# Patient Record
Sex: FEMALE | Race: WHITE | NOT HISPANIC OR LATINO | Employment: OTHER | URBAN - METROPOLITAN AREA
[De-identification: names, ages, dates, MRNs, and addresses within clinical notes are randomized per-mention and may not be internally consistent; named-entity substitution may affect disease eponyms.]

---

## 2019-04-18 ENCOUNTER — TELEPHONE (OUTPATIENT)
Dept: HEMATOLOGY/ONCOLOGY | Facility: CLINIC | Age: 84
End: 2019-04-18

## 2019-04-18 NOTE — TELEPHONE ENCOUNTER
I spoke with Navigator Jill and patient (separately) yesterday and explained the process of Onco consult - chemo orders- insurance pre authorization - schedule chemo.  These cannot be done same day.    Pt scheduled for C2 d8 on May 1 and d15 on May 8.  She is then off for 2 weeks -- she will be restaged at that time.  They are seeking co-management of care where pt can have treatment [place] and periodic check at [place].  Patient today states she has not made a decision yet and will contact me if she thinks she still wants to come here.     Notified [place] Navigator of patient's update. She said that  pt was pushing for chemo in [place] this week because of Jazz fest here.  She is checking with other facilities.   Navigator had advised pt that since she will be spending time here in future, it will be prudent for her to have a local oncologist.    =====================================  ----- Message -----  From: Sarah Wong RN  Sent: 4/18/2019   2:46 PM    Called Nurse Jill Portillo. Recording mentioned she is off Thurs.  Left mss to callback to discuss.  If requesting switch chemo here 5/1, may be too tight as need consult with a breast onc and need more time for pre-auth.    ======================================  ----- Message from Dottie Ramos sent at 4/18/2019 11:42 AM CDT -----  Contact: Breast cancer and infusion treatment   Hi Cancer navigators,     Dr Celeste Trejo is referring this pt to [facility].     Pt is relocating to live with her daughter in [facility].     Pt will need to est care AND continue her infusion treatments 1st week in may.  The pt will have an infusion treatment 4[place]19 [place]    The refrl, records, order are in media mgr.     Can you plz contact the pt to schedule.    Thank you,  Dottie Ramos  [id number]

## 2019-05-09 ENCOUNTER — TELEPHONE (OUTPATIENT)
Dept: SURGERY | Facility: CLINIC | Age: 84
End: 2019-05-09

## 2019-05-10 ENCOUNTER — TELEPHONE (OUTPATIENT)
Dept: SURGERY | Facility: CLINIC | Age: 84
End: 2019-05-10

## 2019-05-14 ENCOUNTER — TELEPHONE (OUTPATIENT)
Dept: SURGERY | Facility: CLINIC | Age: 84
End: 2019-05-14

## 2019-05-14 NOTE — TELEPHONE ENCOUNTER
Spoke with pt, scheduled appt with Dr. Cerna on 5/28. Pt given address and location. Pt verbalized understanding.    ----- Message from Sarah Wong RN sent at 5/9/2019  8:23 AM CDT -----  Regarding: Breast Ca splitting care  [place],    Pt getting chemo  in [place].  Previously wanted to get chemo here while visiting daughter. Because of short notice, couldn't consider request (wanted consult & chemo same day).    Pt is calling now because she wants to split time between dtr in [place] and other dtr here in near future  She said her [place] onco & ins. co. ok with it.      Can you check on this request and let pt know?     Thanks,  Sarah

## 2019-05-28 ENCOUNTER — OFFICE VISIT (OUTPATIENT)
Dept: HEMATOLOGY/ONCOLOGY | Facility: CLINIC | Age: 84
End: 2019-05-28
Payer: MEDICARE

## 2019-05-28 VITALS
SYSTOLIC BLOOD PRESSURE: 132 MMHG | RESPIRATION RATE: 20 BRPM | HEART RATE: 84 BPM | TEMPERATURE: 98 F | HEIGHT: 68 IN | DIASTOLIC BLOOD PRESSURE: 68 MMHG | BODY MASS INDEX: 23.08 KG/M2 | WEIGHT: 152.31 LBS

## 2019-05-28 DIAGNOSIS — Z17.0 MALIGNANT NEOPLASM OF CENTRAL PORTION OF RIGHT BREAST IN FEMALE, ESTROGEN RECEPTOR POSITIVE: Primary | ICD-10-CM

## 2019-05-28 DIAGNOSIS — C79.51 BONE METASTASES: ICD-10-CM

## 2019-05-28 DIAGNOSIS — T45.1X5A CHEMOTHERAPY INDUCED NEUTROPENIA: ICD-10-CM

## 2019-05-28 DIAGNOSIS — C50.111 MALIGNANT NEOPLASM OF CENTRAL PORTION OF RIGHT BREAST IN FEMALE, ESTROGEN RECEPTOR POSITIVE: Primary | ICD-10-CM

## 2019-05-28 DIAGNOSIS — D70.1 CHEMOTHERAPY INDUCED NEUTROPENIA: ICD-10-CM

## 2019-05-28 DIAGNOSIS — I10 BENIGN ESSENTIAL HTN: ICD-10-CM

## 2019-05-28 PROCEDURE — 3078F DIAST BP <80 MM HG: CPT | Mod: CPTII,S$GLB,, | Performed by: INTERNAL MEDICINE

## 2019-05-28 PROCEDURE — 99205 OFFICE O/P NEW HI 60 MIN: CPT | Mod: S$GLB,,, | Performed by: INTERNAL MEDICINE

## 2019-05-28 PROCEDURE — 99205 PR OFFICE/OUTPT VISIT, NEW, LEVL V, 60-74 MIN: ICD-10-PCS | Mod: S$GLB,,, | Performed by: INTERNAL MEDICINE

## 2019-05-28 PROCEDURE — 99999 PR PBB SHADOW E&M-EST. PATIENT-LVL III: CPT | Mod: PBBFAC,,, | Performed by: INTERNAL MEDICINE

## 2019-05-28 PROCEDURE — 3078F PR MOST RECENT DIASTOLIC BLOOD PRESSURE < 80 MM HG: ICD-10-PCS | Mod: CPTII,S$GLB,, | Performed by: INTERNAL MEDICINE

## 2019-05-28 PROCEDURE — 3075F SYST BP GE 130 - 139MM HG: CPT | Mod: CPTII,S$GLB,, | Performed by: INTERNAL MEDICINE

## 2019-05-28 PROCEDURE — 3075F PR MOST RECENT SYSTOLIC BLOOD PRESS GE 130-139MM HG: ICD-10-PCS | Mod: CPTII,S$GLB,, | Performed by: INTERNAL MEDICINE

## 2019-05-28 PROCEDURE — 99999 PR PBB SHADOW E&M-EST. PATIENT-LVL III: ICD-10-PCS | Mod: PBBFAC,,, | Performed by: INTERNAL MEDICINE

## 2019-05-28 PROCEDURE — 1101F PT FALLS ASSESS-DOCD LE1/YR: CPT | Mod: CPTII,S$GLB,, | Performed by: INTERNAL MEDICINE

## 2019-05-28 PROCEDURE — 1101F PR PT FALLS ASSESS DOC 0-1 FALLS W/OUT INJ PAST YR: ICD-10-PCS | Mod: CPTII,S$GLB,, | Performed by: INTERNAL MEDICINE

## 2019-05-28 RX ORDER — LATANOPROST 50 UG/ML
1 SOLUTION/ DROPS OPHTHALMIC
COMMUNITY
Start: 2016-03-12

## 2019-05-28 RX ORDER — DESONIDE 0.5 MG/G
CREAM TOPICAL
Refills: 0 | COMMUNITY
Start: 2019-05-09

## 2019-05-28 RX ORDER — CETIRIZINE HYDROCHLORIDE 5 MG/1
5 TABLET ORAL
COMMUNITY

## 2019-05-28 RX ORDER — CLOBETASOL PROPIONATE 0.5 MG/G
1 OINTMENT TOPICAL
COMMUNITY
Start: 2018-11-06

## 2019-05-28 RX ORDER — TIMOLOL MALEATE 5 MG/ML
1 SOLUTION/ DROPS OPHTHALMIC
COMMUNITY
Start: 2017-12-07

## 2019-05-28 RX ORDER — OMEPRAZOLE 20 MG/1
20 CAPSULE, DELAYED RELEASE ORAL DAILY
Refills: 0 | COMMUNITY
Start: 2019-04-25

## 2019-05-28 RX ORDER — ERYTHROMYCIN 5 MG/G
OINTMENT OPHTHALMIC
Refills: 1 | COMMUNITY
Start: 2019-05-22

## 2019-05-28 RX ORDER — MONTELUKAST SODIUM 10 MG/1
10 TABLET ORAL NIGHTLY
Refills: 1 | COMMUNITY
Start: 2019-04-04

## 2019-05-28 RX ORDER — FLUTICASONE PROPIONATE 50 MCG
2 SPRAY, SUSPENSION (ML) NASAL
COMMUNITY
Start: 2019-05-09

## 2019-05-28 RX ORDER — ONDANSETRON 4 MG/1
4 TABLET, FILM COATED ORAL EVERY 6 HOURS PRN
COMMUNITY
Start: 2019-03-29

## 2019-05-28 RX ORDER — ACETAMINOPHEN 500 MG
500 TABLET ORAL
COMMUNITY

## 2019-05-28 RX ORDER — LIDOCAINE AND PRILOCAINE 25; 25 MG/G; MG/G
CREAM TOPICAL DAILY PRN
Refills: 2 | COMMUNITY
Start: 2019-04-05

## 2019-05-28 NOTE — PLAN OF CARE
START OFF PATHWAY REGIMEN - Breast            Atezolizumab (Tecentriq(R))       Nab-paclitaxel (protein bound) (Abraxane(R))     **Always confirm dose/schedule in your pharmacy ordering system**        Patient Characteristics:  Distant Metastases or Locoregional Recurrent Disease - Unresected, HER2   Negative/Unknown/Equivocal, ER Negative/Unknown, Chemotherapy, First Line  Therapeutic Status: Distant Metastases  BRCA Mutation Status: Absent  ER Status: Negative (-)  HER2 Status: Negative (-)  DC Status: Negative (-)  Line of therapy: First Line  Intent of Therapy:  Non-Curative / Palliative Intent, Discussed with Patient

## 2019-05-28 NOTE — ASSESSMENT & PLAN NOTE
- Cycle 3 day 15 due June 5th with 20% dose reduction of Abraxane (already reduced in our plan entered here). She will receive in [place]  - Rad Onc and NSGY eval ongoing for possibel C2 progression  - Patient will notify us of her schedule and when she would like to receive treatment here. She knows that the more advanced notice we have, the better.   - Plan for repeat imaging studies with Dr Brewer after completion of 3rd cycle.   - Will need to compare doses with CareEverywhere doses before each dose

## 2019-05-28 NOTE — PROGRESS NOTES
History:     Reason For Consultation:   1. 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 Follows with [facility].     Referring Provider:   Aaareferral Self  No address on file    Records Obtained: Records of the patients history including those obtained from the referring provider were reviewed and summarized in detail.    HPI:   Soni Schwartz presents for consultation breast cancer. She is postmenopausal. She presented for neck pain in January 2019 at outside hospital. MRI of neck showed concerning C2 abnormality worrisome for malignancy. Breast exam revealed a right breast mass which was confirmed on mammogram and US. Biopsy of the right breast mass showed grade 3 invasive ductal carcinoma, weakly ER positive (5%), [place] neg, Her2 negative, Ki67 50%. PET showed a 2.3 cm right breast mass and a C2 lytic lesion. She received EBRT to C2 and started Abraxane/Tecentriq. She is hoping to receive some of her care in [place] since she has one daughter that lives here. She's tolerating chemotherapy quite well with side effects of fatigue and neutropenia requiring a 20% dose reduction in Abraxane. Recently, here neck pain returned (had initially improved with RT) and imaging is concerning for possible progression and/or fracture. She has seen NSGY and Rad Onc and may have further surgery/RT. She's due for cycle 3 day 15 on June 5th - has delayed day 15 dose for a wedding.       Past Medical   Past Medical History:   Diagnosis Date    Allergy     Breast cancer     GERD (gastroesophageal reflux disease)     Glaucoma     Venous stasis      Patient Active Problem List   Diagnosis    Malignant neoplasm of central portion of right breast in female, estrogen receptor positive    Chemotherapy induced neutropenia    Benign essential HTN    Bone metastases     Social History   Social History     Socioeconomic History    Marital status:       Spouse name: Not on file    Number of children: Not on file    Years of education: Not on file    Highest education level: Not on file   Occupational History    Not on file   Social Needs    Financial resource strain: Not on file    Food insecurity:     Worry: Not on file     Inability: Not on file    Transportation needs:     Medical: Not on file     Non-medical: Not on file   Tobacco Use    Smoking status: Former Smoker     Packs/day: 2.00     Years: 30.00     Pack years: 60.00     Types: Cigarettes     Last attempt to quit:      Years since quittin.4    Smokeless tobacco: Never Used   Substance and Sexual Activity    Alcohol use: Yes     Comment: Occasionally     Drug use: Not Currently    Sexual activity: Not Currently   Lifestyle    Physical activity:     Days per week: Not on file     Minutes per session: Not on file    Stress: Not on file   Relationships    Social connections:     Talks on phone: Not on file     Gets together: Not on file     Attends Cheondoism service: Not on file     Active member of club or organization: Not on file     Attends meetings of clubs or organizations: Not on file     Relationship status: Not on file   Other Topics Concern    Not on file   Social History Narrative    Not on file     Family History  Family History   Problem Relation Age of Onset    Breast cancer Sister     Cancer Brother         Brain and Kidney     Parkinsonism Mother     Diabetes Father     No Known Problems Maternal Aunt     Leukemia Maternal Uncle     No Known Problems Paternal Aunt     No Known Problems Paternal Uncle     No Known Problems Maternal Grandmother     No Known Problems Maternal Grandfather     No Known Problems Paternal Grandmother     No Known Problems Paternal Grandfather     Cancer Brother     No Known Problems Brother     No Known Problems Brother     Thyroid disease Sister     No Known Problems Sister     No Known Problems Daughter      No Known Problems Daughter     No Known Problems Son      Medications    Current Outpatient Medications:     acetaminophen (TYLENOL) 500 MG tablet, Take 500 mg by mouth., Disp: , Rfl:     cetirizine (ZYRTEC) 5 MG tablet, Take 5 mg by mouth., Disp: , Rfl:     clobetasol 0.05% (TEMOVATE) 0.05 % Oint, Apply 1 application topically., Disp: , Rfl:     desonide (DESOWEN) 0.05 % cream, APPLY TOPICALLY 2 (TWO) TIMES DAILY AS NEEDED FOR ITCHING., Disp: , Rfl: 0    erythromycin (ROMYCIN) ophthalmic ointment, APPLY A SMALL AMOUNT ON EYELID TWICE A DAY USE ON EYELIDS BILATERAL AFTER COMPRESS AND LID SCRUB, Disp: , Rfl: 1    fluticasone propionate (FLONASE) 50 mcg/actuation nasal spray, 2 sprays by Nasal route., Disp: , Rfl:     latanoprost 0.005 % ophthalmic solution, 1 drop., Disp: , Rfl:     lidocaine-prilocaine (EMLA) cream, daily as needed. Apply to affected area, Disp: , Rfl: 2    montelukast (SINGULAIR) 10 mg tablet, Take 10 mg by mouth nightly., Disp: , Rfl: 1    omeprazole (PRILOSEC) 20 MG capsule, Take 20 mg by mouth once daily., Disp: , Rfl: 0    ondansetron (ZOFRAN) 4 MG tablet, Take 4 mg by mouth every 6 (six) hours as needed., Disp: , Rfl:     timolol maleate 0.5% (TIMOPTIC) 0.5 % Drop, 1 drop., Disp: , Rfl:   Allergies  Review of patient's allergies indicates:   Allergen Reactions    Indomethacin submicronized Hallucinations    Naproxen Other (See Comments)     GI Upset     Sulfa (sulfonamide antibiotics) Rash     Review of Systems  Review of Systems   Constitutional: Positive for fatigue. Negative for activity change, appetite change, chills, fever and unexpected weight change.   HENT: Negative for congestion, hearing loss, nosebleeds, sinus pressure and trouble swallowing.    Eyes: Negative for pain, discharge and itching.   Respiratory: Negative for cough, chest tightness and shortness of breath.    Cardiovascular: Negative for chest pain, palpitations and leg swelling.   Gastrointestinal:  "Negative for abdominal distention, abdominal pain, blood in stool, diarrhea, nausea, rectal pain and vomiting.   Endocrine: Negative for heat intolerance and polydipsia.   Genitourinary: Negative for difficulty urinating, dysuria, flank pain, frequency, hematuria and urgency.   Musculoskeletal: Positive for back pain and neck pain. Negative for arthralgias.   Skin: Negative for color change, pallor and rash.   Neurological: Negative for dizziness, numbness and headaches.   Hematological: Negative for adenopathy. Does not bruise/bleed easily.   Psychiatric/Behavioral: Negative for confusion and decreased concentration. The patient is not nervous/anxious.         Objective     Objective:     Vitals:    05/28/19 1033   BP: 132/68   BP Location: Left arm   Patient Position: Sitting   Pulse: 84   Resp: 20   Temp: 98.2 °F (36.8 °C)   TempSrc: Oral   Weight: 69.1 kg (152 lb 5.4 oz)   Height: 5' 8" (1.727 m)     Body surface area is 1.82 meters squared.  GENERAL:  Well-developed, well-nourished female in no acute distress. Vital signs reviewed. Accompanied by daughter.  SKIN:  Warm, dry without rashes, purpura or petechiae.  EYES:  Pupils equal, round and reactive to light.  EOMs intact.  Conjunctivae normal.  HEAD:  Normocephalic.  EARS/NOSE/MOUTH/THROAT:  Hearing intact. Septum midline.  No excoriations or nasal discharge. No stomatitis or ulcers.  Lips normal. Oropharynx without lesions or exudates.  NECK:  Supple with good range of motion; no thyromegaly or masses  LYMPHATICS:  No cervical, supraclavicular, axillary adenopathy.  RESP:  Lungs clear to percussion and auscultation. Good airflow. Normal respiratory effort.   CARDIAC:  Regular rate and rhythm without murmurs, rubs or gallops. Normal S1,S2. No edema  GI:  Soft, nontender, no hepatosplenomegaly, normal bowel sounds  MSK:  No clubbing or cyanosis, No joint swelling noted in hands  NEUROLOGICAL:  Cranial Nerves II-XII grossly intact.  No focal neurological " deficits.  PSYCHIATRIC:  Normal affect and mood.  Alert and Oriented x 3.     Labs and Imaging  Outside labs and imaging reviewed.   Breast pathology and imaging as above.     Assessment     Assessment:     Problem List Items Addressed This Visit        Cardiac/Vascular    Benign essential HTN       Oncology    Malignant neoplasm of central portion of right breast in female, estrogen receptor positive - Primary    Overview     1. 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 Follows with [facility].    * Presented with oligometastatic disease to C2 and 2.3 cm right breast mass   * status post EBRT to C2, completed 3/22/19   * Currently on weekly Abraxane days 1,8,15 every 28 days with Tecentric days 1 and 15 every 28 days. 20% dose reduction in Abraxane.   * Follows with Dr Celeste Brewer in [place] but is spending some time with her daughter in [place] thus desires to receive some treatments in [place], thus we will be working in conjunction with Dr Brewer. Scans and major treatment decisions will be done in [place]. Dr Brewer's plan is to do at least 4-6 cycles of chemo/immunotherapy and if no progression, then consider stopping Abraxane and continue with immunotherapy.    * After 2.5 cycles, improvement in breast mass but possible progression of C2. Plan repeat imaging after completed 3 cycles.          Current Assessment & Plan     - Cycle 3 day 15 due June 5th with 20% dose reduction of Abraxane (already reduced in our plan entered here). She will receive in [place]  - Rad Onc and NSGY eval ongoing for possibel C2 progression  - Patient will notify us of her schedule and when she would like to receive treatment here. She knows that the more advanced notice we have, the better.   - Plan for repeat imaging studies with Dr Brewer after completion of 3rd cycle.   - Will need to compare doses with CareEverywhere doses before each  dose         Chemotherapy induced neutropenia    Overview     Improved with dose reduction         Bone metastases    Overview     Xgeva with Dr Brewer in [place]               Plan:     1. Will go on and get prior auth for treatment - she will be receiving some doses here in [place] and some in [place]. She will notify us when she wants treatment here. Will needs labs - cbc, cmp and possible MD visit before treatment.     Follow-up when treatment needed in town. I asked the patient to call for any questions, concerns, or new symptoms.    Distress Screening Results: Psychosocial Distress screening score of Distress Score: 0 noted and reviewed. No intervention indicated.     I spent 60 minutes with patient and family with 55 spent face to face counseling on diagnosis, goals of therapy, side effects.

## 2019-05-28 NOTE — Clinical Note
Prior auth for entered plan, but patient will be receiving treatments both in [place] and [place]. Will call when she's ready for treatment here.

## 2019-06-11 ENCOUNTER — PATIENT MESSAGE (OUTPATIENT)
Dept: HEMATOLOGY/ONCOLOGY | Facility: CLINIC | Age: 84
End: 2019-06-11

## 2019-06-19 ENCOUNTER — PATIENT MESSAGE (OUTPATIENT)
Dept: HEMATOLOGY/ONCOLOGY | Facility: CLINIC | Age: 84
End: 2019-06-19

## 2019-06-19 ENCOUNTER — TELEPHONE (OUTPATIENT)
Dept: HEMATOLOGY/ONCOLOGY | Facility: CLINIC | Age: 84
End: 2019-06-19

## 2019-06-19 DIAGNOSIS — Z51.11 ENCOUNTER FOR ANTINEOPLASTIC CHEMOTHERAPY: Primary | ICD-10-CM

## 2019-06-19 NOTE — TELEPHONE ENCOUNTER
Contacted patient to discuss her concerns. Patient due for C4D1 Tecentriq/taxol chemotherapy per Antwan Lutz Notes. Per patient she is coming in to town tomorrow but her cycle is not due until 7/3. Explained to patient she will need labs, and provider visit beforehand.   Patient requesting to have labs the day before at an [facility] location close to [facility]. Will arrange.  Patient also requesting to see specifically Dr. Cerna if at all possible, given this is her first chemo infusion here at [facility] and she is already apprehensive. Per our records, patient will need to be consented.   Explained to patient Dr. Cerna schedule full the day she is due to come in. Will discuss with Dr. Cerna upon her return to clinic to see if she would be willing to make an exception to see the patient. She expressed gratitude.   Explained to patient would follow up with her on Monday to confirm and discuss all appointments.   She expressed gratitude.

## 2019-06-19 NOTE — TELEPHONE ENCOUNTER
----- Message from Octavio Hunt sent at 6/19/2019  2:14 PM CDT -----  Contact: Dr Brewer ([place] [facility])  Would like a call to coordinate pt's infusion schedule while the pt is in town. Please contact to assist.      Contact:: [phone]

## 2019-06-20 NOTE — TELEPHONE ENCOUNTER
Followed up with patient this morning to verify and confirm all appointments were arranged.   Explained to patient that Dr. Cerna responded and is able to see patient the afternoon of 7/3 prior to her chemo. Patient very appreciative. Confirmed all future dates, times, locations of upcoming appointments. All additional questions/concerns answered. Patient expressed gratitude.   Patient advised to contact clinic for any other concerns.

## 2019-06-24 ENCOUNTER — DOCUMENTATION ONLY (OUTPATIENT)
Dept: HEMATOLOGY/ONCOLOGY | Facility: CLINIC | Age: 84
End: 2019-06-24

## 2019-06-24 NOTE — PROGRESS NOTES
Outside records from 6/19 reviewed. She's completing cycle 3 Abraxane 80 mg/m2 days 1, 8, 15 every 28 days with Atezolizumab in [place]. She will receive cycle 4 day 1 here and then return to [facility] for cycle 4 day 8 with scans the day before. Zometa due in July (in [place]).

## 2019-06-27 ENCOUNTER — TELEPHONE (OUTPATIENT)
Dept: HEMATOLOGY/ONCOLOGY | Facility: CLINIC | Age: 84
End: 2019-06-27

## 2019-06-27 DIAGNOSIS — N30.01 ACUTE CYSTITIS WITH HEMATURIA: Primary | ICD-10-CM

## 2019-06-27 PROBLEM — N30.00 ACUTE CYSTITIS WITHOUT HEMATURIA: Status: ACTIVE | Noted: 2019-06-27

## 2019-06-27 NOTE — PROGRESS NOTES
History:     Reason For Consultation:   1. 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 Follows with [facility].     Records Obtained: Records of the patients history including those obtained from the referring provider were reviewed and summarized in detail.    HPI:   Soni Schwartz presents for urgent care visit. She was treated 6/14/19 in [place], [place] for a UTI with zosyn followed by cefuroxime x 5 days. She complains that she did not get relief after these treatments. She presents today with a UA from an outside hospital which shows few bacteria, 3+ blood and positive for ketones. Her PCP at [facility] did not want to treat her given that she is on active chemotherapy. There is a UA and reflex urine culture pending here. She does have burning, frequency and urgency and lower abdominal pressure. Denies fevers and chills. States she is eating adequate, but could hydrate more.     Presents for consultation breast cancer. She is postmenopausal. She presented for neck pain in January 2019 at outside hospital. MRI of neck showed concerning C2 abnormality worrisome for malignancy. Breast exam revealed a right breast mass which was confirmed on mammogram and US. Biopsy of the right breast mass showed grade 3 invasive ductal carcinoma, weakly ER positive (5%), [place] neg, Her2 negative, Ki67 50%. PET showed a 2.3 cm right breast mass and a C2 lytic lesion. She received EBRT to C2 and started Abraxane/Tecentriq. She is hoping to receive some of her care in [place] since she has one daughter that lives here. She's tolerating chemotherapy quite well with side effects of fatigue and neutropenia requiring a 20% dose reduction in Abraxane. Recently, here neck pain returned (had initially improved with RT) and imaging is concerning for possible progression and/or fracture. She has seen NSGY and Rad Onc and may have further  surgery/RT. She's due for cycle 3 day 15 on June 5th - has delayed day 15 dose for a wedding.       Review of Systems   Constitutional: Positive for fatigue. Negative for activity change, appetite change, chills, fever and unexpected weight change.   HENT: Negative for congestion, hearing loss, nosebleeds, sinus pressure and trouble swallowing.    Eyes: Negative for pain, discharge and itching.   Respiratory: Negative for cough, chest tightness and shortness of breath.    Cardiovascular: Negative for chest pain, palpitations and leg swelling.   Gastrointestinal: Negative for abdominal distention, abdominal pain, blood in stool, diarrhea, nausea, rectal pain and vomiting.   Endocrine: Negative for heat intolerance and polydipsia.   Genitourinary: Positive for dysuria, hematuria, pelvic pain and urgency. Negative for difficulty urinating, flank pain and frequency.   Musculoskeletal: Negative for arthralgias, back pain and neck pain.   Skin: Negative for color change, pallor and rash.   Neurological: Negative for dizziness, numbness and headaches.   Hematological: Negative for adenopathy. Does not bruise/bleed easily.   Psychiatric/Behavioral: Negative for confusion and decreased concentration. The patient is not nervous/anxious.        Objective:     GENERAL:  Well-developed, well-nourished female in no acute distress. Vital signs reviewed.   SKIN:  Warm, dry without rashes, purpura or petechiae.  EYES:  Pupils equal, round and reactive to light.  EOMs intact.  Conjunctivae normal.  HEAD:  Normocephalic.  EARS/NOSE/MOUTH/THROAT:  Hearing intact. Septum midline.  No excoriations or nasal discharge. No stomatitis or ulcers.  Lips normal. Oropharynx without lesions or exudates.  NECK:  Supple with good range of motion; no thyromegaly or masses  LYMPHATICS:  No cervical, supraclavicular, axillary adenopathy.  RESP:  Lungs clear to percussion and auscultation. Good airflow. Normal respiratory effort.   CARDIAC:  Regular  rate and rhythm without murmurs, rubs or gallops. Normal S1,S2. No edema  GI:  Soft, nontender, no hepatosplenomegaly, normal bowel sounds  MSK:  No clubbing or cyanosis, No joint swelling noted in hands  NEUROLOGICAL:  Cranial Nerves II-XII grossly intact.  No focal neurological deficits.  PSYCHIATRIC:  Normal affect and mood.  Alert and Oriented x 3.     Labs and Imaging  Outside labs and imaging reviewed.       Assessment:     Problem List Items Addressed This Visit     Malignant neoplasm of central portion of right breast in female, estrogen receptor positive - Primary    Overview     1. 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 Follows with [facility].    * Presented with oligometastatic disease to C2 and 2.3 cm right breast mass   * status post EBRT to C2, completed 3/22/19   * Currently on weekly Abraxane days 1,8,15 every 28 days with Tecentric days 1 and 15 every 28 days. 20% dose reduction in Abraxane.   * Follows with Dr Celeste Brewer in [place] but is spending some time with her daughter in [place] thus desires to receive some treatments in [place], thus we will be working in conjunction with Dr Brewer. Scans and major treatment decisions will be done in [place]. Dr Brewer's plan is to do at least 4-6 cycles of chemo/immunotherapy and if no progression, then consider stopping Abraxane and continue with immunotherapy.    * After 2.5 cycles, improvement in breast mass but possible progression of C2. Plan repeat imaging after completed 3 cycles.          Acute cystitis without hematuria          Plan:   1. Follow up with Dr. Cerna on Wednesday 7/3.   2. Given ciprofloxacin 500 mg BID x 2 weeks - referral made to urology for complicated history of UTIs - to see urology Monday 7/1    Distress Screening Results: Psychosocial Distress screening score of   noted and reviewed. No intervention indicated.         Claire SHIPLEY  [facility]  Oncology     Patient dicussed with supervising physician, Dr. Cerna.

## 2019-06-27 NOTE — TELEPHONE ENCOUNTER
"Returned call to pt.   Pt stated that she has been having UTI symptoms for 3 weeks now.   Pt stated that she had a urine sample done on 6/18 by Dr. Brewer (her local oncologist) which was negative.   Pt stated that she was later given an abx for a "bladder infection" which she was on for 8 days and feels like abx has not helped "only has gotten worse and now has blood in her urine."   Pt stated she went to her PCP and had another urine sample at [facility] and was informed by her PCP that should be managed by [place] oncologist, Dr. Cerna.   Pt wants to be seen tomorrow for UTI complaints as well as gallbladder issues from recent hospitalization (pt is in no pain at this time).   Informed pt that Dr. Cerna unfortunately does not have any availability tomorrow but offered UC visit with NP as pt is in [place] at this time and Judie, Dr. Brewer's nurse, does not want her to wait over the weekend.   Pt agreeable to NP visit tomorrow, labs scheduled at 9am and visit at 10am.   Asked that pt please bring records of recent labs and visits for reference tomorrow.   Pt verbalized understanding and thanked nurse.                  ----- Message from Noelle Junior sent at 6/27/2019  3:09 PM CDT -----  Pt would like to come in tomorrow, she noticed blood in her urine, she's in the area and she has some concerns asking be contacted. She saw a doctor in [place], [place] and they advised of a issue with her gal bladder etc. Please contact her at [phone].   "Thank you for all that you do for our patients'"        "

## 2019-06-28 ENCOUNTER — LAB VISIT (OUTPATIENT)
Dept: LAB | Facility: HOSPITAL | Age: 84
End: 2019-06-28
Attending: INTERNAL MEDICINE
Payer: MEDICARE

## 2019-06-28 ENCOUNTER — OFFICE VISIT (OUTPATIENT)
Dept: HEMATOLOGY/ONCOLOGY | Facility: CLINIC | Age: 84
End: 2019-06-28
Payer: MEDICARE

## 2019-06-28 VITALS
TEMPERATURE: 98 F | OXYGEN SATURATION: 97 % | WEIGHT: 145.94 LBS | BODY MASS INDEX: 22.12 KG/M2 | RESPIRATION RATE: 16 BRPM | HEIGHT: 68 IN | HEART RATE: 88 BPM | DIASTOLIC BLOOD PRESSURE: 61 MMHG | SYSTOLIC BLOOD PRESSURE: 116 MMHG

## 2019-06-28 DIAGNOSIS — N30.00 ACUTE CYSTITIS WITHOUT HEMATURIA: ICD-10-CM

## 2019-06-28 DIAGNOSIS — N30.01 ACUTE CYSTITIS WITH HEMATURIA: ICD-10-CM

## 2019-06-28 DIAGNOSIS — C50.111 MALIGNANT NEOPLASM OF CENTRAL PORTION OF RIGHT BREAST IN FEMALE, ESTROGEN RECEPTOR POSITIVE: Primary | ICD-10-CM

## 2019-06-28 DIAGNOSIS — Z17.0 MALIGNANT NEOPLASM OF CENTRAL PORTION OF RIGHT BREAST IN FEMALE, ESTROGEN RECEPTOR POSITIVE: Primary | ICD-10-CM

## 2019-06-28 LAB
ALBUMIN SERPL BCP-MCNC: 3.3 G/DL (ref 3.5–5.2)
ALP SERPL-CCNC: 169 U/L (ref 55–135)
ALT SERPL W/O P-5'-P-CCNC: 78 U/L (ref 10–44)
ANION GAP SERPL CALC-SCNC: 8 MMOL/L (ref 8–16)
AST SERPL-CCNC: 119 U/L (ref 10–40)
BILIRUB SERPL-MCNC: 0.7 MG/DL (ref 0.1–1)
BUN SERPL-MCNC: 15 MG/DL (ref 8–23)
CALCIUM SERPL-MCNC: 9.6 MG/DL (ref 8.7–10.5)
CHLORIDE SERPL-SCNC: 107 MMOL/L (ref 95–110)
CO2 SERPL-SCNC: 28 MMOL/L (ref 23–29)
CREAT SERPL-MCNC: 0.9 MG/DL (ref 0.5–1.4)
ERYTHROCYTE [DISTWIDTH] IN BLOOD BY AUTOMATED COUNT: 15.3 % (ref 11.5–14.5)
EST. GFR  (AFRICAN AMERICAN): >60 ML/MIN/1.73 M^2
EST. GFR  (NON AFRICAN AMERICAN): 59.3 ML/MIN/1.73 M^2
GLUCOSE SERPL-MCNC: 117 MG/DL (ref 70–110)
HCT VFR BLD AUTO: 31.8 % (ref 37–48.5)
HGB BLD-MCNC: 10.5 G/DL (ref 12–16)
IMM GRANULOCYTES # BLD AUTO: 0.05 K/UL (ref 0–0.04)
MCH RBC QN AUTO: 32.7 PG (ref 27–31)
MCHC RBC AUTO-ENTMCNC: 33 G/DL (ref 32–36)
MCV RBC AUTO: 99 FL (ref 82–98)
NEUTROPHILS # BLD AUTO: 1 K/UL (ref 1.8–7.7)
PLATELET # BLD AUTO: 315 K/UL (ref 150–350)
PMV BLD AUTO: 9.2 FL (ref 9.2–12.9)
POTASSIUM SERPL-SCNC: 3.7 MMOL/L (ref 3.5–5.1)
PROT SERPL-MCNC: 6.5 G/DL (ref 6–8.4)
RBC # BLD AUTO: 3.21 M/UL (ref 4–5.4)
SODIUM SERPL-SCNC: 143 MMOL/L (ref 136–145)
WBC # BLD AUTO: 2.16 K/UL (ref 3.9–12.7)

## 2019-06-28 PROCEDURE — 80053 COMPREHEN METABOLIC PANEL: CPT

## 2019-06-28 PROCEDURE — 85027 COMPLETE CBC AUTOMATED: CPT

## 2019-06-28 PROCEDURE — 99213 PR OFFICE/OUTPT VISIT, EST, LEVL III, 20-29 MIN: ICD-10-PCS | Mod: S$GLB,,, | Performed by: NURSE PRACTITIONER

## 2019-06-28 PROCEDURE — 99999 PR PBB SHADOW E&M-EST. PATIENT-LVL IV: ICD-10-PCS | Mod: PBBFAC,,, | Performed by: NURSE PRACTITIONER

## 2019-06-28 PROCEDURE — 99213 OFFICE O/P EST LOW 20 MIN: CPT | Mod: S$GLB,,, | Performed by: NURSE PRACTITIONER

## 2019-06-28 PROCEDURE — 99999 PR PBB SHADOW E&M-EST. PATIENT-LVL IV: CPT | Mod: PBBFAC,,, | Performed by: NURSE PRACTITIONER

## 2019-06-28 PROCEDURE — 36415 COLL VENOUS BLD VENIPUNCTURE: CPT

## 2019-06-28 RX ORDER — CIPROFLOXACIN 500 MG/1
500 TABLET ORAL 2 TIMES DAILY
Qty: 28 TABLET | Refills: 0 | Status: SHIPPED | OUTPATIENT
Start: 2019-06-28 | End: 2019-07-12

## 2019-07-01 ENCOUNTER — TELEPHONE (OUTPATIENT)
Dept: HEMATOLOGY/ONCOLOGY | Facility: CLINIC | Age: 84
End: 2019-07-01

## 2019-07-01 ENCOUNTER — TELEPHONE (OUTPATIENT)
Dept: UROLOGY | Facility: CLINIC | Age: 84
End: 2019-07-01

## 2019-07-01 ENCOUNTER — OFFICE VISIT (OUTPATIENT)
Dept: UROLOGY | Facility: CLINIC | Age: 84
End: 2019-07-01
Payer: MEDICARE

## 2019-07-01 ENCOUNTER — HOSPITAL ENCOUNTER (OUTPATIENT)
Dept: UROLOGY | Facility: HOSPITAL | Age: 84
Discharge: HOME | End: 2019-07-01
Attending: UROLOGY
Payer: MEDICARE

## 2019-07-01 VITALS
SYSTOLIC BLOOD PRESSURE: 112 MMHG | DIASTOLIC BLOOD PRESSURE: 74 MMHG | HEART RATE: 98 BPM | BODY MASS INDEX: 22.19 KG/M2 | HEIGHT: 68 IN | WEIGHT: 146.38 LBS

## 2019-07-01 VITALS
RESPIRATION RATE: 17 BRPM | SYSTOLIC BLOOD PRESSURE: 123 MMHG | HEART RATE: 102 BPM | HEIGHT: 68 IN | DIASTOLIC BLOOD PRESSURE: 66 MMHG | BODY MASS INDEX: 22.19 KG/M2 | TEMPERATURE: 98 F | WEIGHT: 146.38 LBS

## 2019-07-01 DIAGNOSIS — R30.0 DYSURIA: Primary | ICD-10-CM

## 2019-07-01 PROCEDURE — 52000 CYSTOURETHROSCOPY: CPT

## 2019-07-01 PROCEDURE — 99204 PR OFFICE/OUTPT VISIT, NEW, LEVL IV, 45-59 MIN: ICD-10-PCS | Mod: 25,S$GLB,, | Performed by: UROLOGY

## 2019-07-01 PROCEDURE — 1101F PT FALLS ASSESS-DOCD LE1/YR: CPT | Mod: CPTII,S$GLB,, | Performed by: UROLOGY

## 2019-07-01 PROCEDURE — 3074F SYST BP LT 130 MM HG: CPT | Mod: CPTII,S$GLB,, | Performed by: UROLOGY

## 2019-07-01 PROCEDURE — 52000 CYSTOURETHROSCOPY: CPT | Mod: ,,, | Performed by: UROLOGY

## 2019-07-01 PROCEDURE — 99999 PR PBB SHADOW E&M-EST. PATIENT-LVL III: ICD-10-PCS | Mod: PBBFAC,,, | Performed by: UROLOGY

## 2019-07-01 PROCEDURE — 3078F DIAST BP <80 MM HG: CPT | Mod: CPTII,S$GLB,, | Performed by: UROLOGY

## 2019-07-01 PROCEDURE — 99204 OFFICE O/P NEW MOD 45 MIN: CPT | Mod: 25,S$GLB,, | Performed by: UROLOGY

## 2019-07-01 PROCEDURE — 52000 PR CYSTOURETHROSCOPY: ICD-10-PCS | Mod: ,,, | Performed by: UROLOGY

## 2019-07-01 PROCEDURE — 99999 PR PBB SHADOW E&M-EST. PATIENT-LVL III: CPT | Mod: PBBFAC,,, | Performed by: UROLOGY

## 2019-07-01 PROCEDURE — 1101F PR PT FALLS ASSESS DOC 0-1 FALLS W/OUT INJ PAST YR: ICD-10-PCS | Mod: CPTII,S$GLB,, | Performed by: UROLOGY

## 2019-07-01 PROCEDURE — 3074F PR MOST RECENT SYSTOLIC BLOOD PRESSURE < 130 MM HG: ICD-10-PCS | Mod: CPTII,S$GLB,, | Performed by: UROLOGY

## 2019-07-01 PROCEDURE — 3078F PR MOST RECENT DIASTOLIC BLOOD PRESSURE < 80 MM HG: ICD-10-PCS | Mod: CPTII,S$GLB,, | Performed by: UROLOGY

## 2019-07-01 RX ORDER — LIDOCAINE HYDROCHLORIDE 20 MG/ML
JELLY TOPICAL
Status: COMPLETED | OUTPATIENT
Start: 2019-07-01 | End: 2019-07-01

## 2019-07-01 RX ADMIN — LIDOCAINE HYDROCHLORIDE: 20 JELLY TOPICAL at 01:07

## 2019-07-01 NOTE — OP NOTE
DATE OF PROCEDURE:  07/01/2019    PREOPERATIVE DIAGNOSES:  Metastatic breast cancer, recent urinary tract   infection, atrophic vaginitis and dysuria.    POSTOPERATIVE DIAGNOSES:  Metastatic breast cancer, recent urinary tract   infection, atrophic vaginitis and dysuria.    OPERATION PERFORMED:  Flexible cystoscopy.    PROCEDURE IN DETAIL:  The patient was prepped and draped in dorsal lithotomy   position.  Xylocaine jelly was instilled per urethra.  The patient had   significant atrophic vaginitis.  No masses were encountered.  The urethra was   inspected and without diverticula.  Bladder neck was normal.  Both ureteral   orifices were normal size, shape and position.  Bladder was somewhat small and   painful on distention, but no obvious masses were noted.  No biopsies were done   since the procedure was somewhat painful for the patient.    IMPRESSION:  Metastatic breast cancer, atrophic vaginitis and dysuria.    RECOMMENDATIONS:  The patient is finishing up her antibiotics.  Normally, I   would recommend some sort of estrogen cream; however, with her history of   metastatic breast cancer, I would hold off doing that unless her oncologist Dr. Cerna was agreed to this.  I have recommended that she take Azo-Standard 1 to 2   p.o. b.i.d. to t.i.d. and she will follow up with her urologist in [place], [place].  We will await a reply from Dr. Cerna.      ZULMA/IN  dd: 07/01/2019 14:08:30 (CDT)  td: 07/01/2019 18:44:33 (CDT)  Doc ID   #[id number]  Job ID #[id number]    CC:

## 2019-07-01 NOTE — LETTER
July 1, 2019      Claire Cloud, NP  [address]  [facility] [address]           [facility] 4th Floor  [address]  [facility] [address]  Phone: [phone]          Patient: Soni Schwartz   MR Number: [id number]   YOB: 1935   Date of Visit: 7/1/2019       Dear Claire Cloud:    Thank you for referring Soni Schwartz to me for evaluation. Attached you will find relevant portions of my assessment and plan of care.    If you have questions, please do not hesitate to call me. I look forward to following Soni Schwartz along with you.    Sincerely,    Johny Soriano Jr., MD    Enclosure  CC:  No Recipients    If you would like to receive this communication electronically, please contact [email or web address] or [phone] to request more information on [facility] access.    For providers and/or their staff who would like to refer a patient to [facility], please contact us through our one-stop-shop provider referral line, [facility], at [phone].    If you feel you have received this communication in error or would no longer like to receive these types of communications, please e-mail [email or web address]

## 2019-07-01 NOTE — PATIENT INSTRUCTIONS
What to Expect After a Cystoscopy  For the next 24-48 hours, you may feel a mild burning when you urinate. This burning is normal and expected. Drink plenty of water to dilute the urine to help relieve the burning sensation. You may also see a small amount of blood in your urine after the procedure.    Unless you are already taking antibiotics, you may be given an antibiotic after the test to prevent infection.    Signs and Symptoms to Report  Call the [facility] at [phone] if you develop any of the following:  · Fever of 101 degrees or higher  · Chills or persistent bleeding  · Inability to urinate

## 2019-07-01 NOTE — PROGRESS NOTES
Subjective:       Patient ID: Soni Schwartz is a 84 y.o. female.    Chief Complaint: Advice Only (poss uti for a month she had  issue with gallbladder and was put on antibotics which is cipro 500mg bid )    HPI  Soni Schwartz is a 84 y.o. female with PMHx of GERD, venous stasis, and Allergy presents to the clinic for evaluation and management of acute cystitis with hematuria. Was recently hospitalized at a facility in [place], [place] for a urinary infection. Began UTI management with antibiotics 1 month ago, but feels that symptoms have not resolved with Cipro. No other urologic complaints stated at this time.     Past Medical History:   Diagnosis Date    Allergy     Breast cancer     GERD (gastroesophageal reflux disease)     Glaucoma     Venous stasis        Past Surgical History:   Procedure Laterality Date    APPENDECTOMY      BONE BIOPSY      BREAST BIOPSY      ROTATOR CUFF REPAIR Left        Family History   Problem Relation Age of Onset    Breast cancer Sister     Cancer Brother         Brain and Kidney     Parkinsonism Mother     Diabetes Father     No Known Problems Maternal Aunt     Leukemia Maternal Uncle     No Known Problems Paternal Aunt     No Known Problems Paternal Uncle     No Known Problems Maternal Grandmother     No Known Problems Maternal Grandfather     No Known Problems Paternal Grandmother     No Known Problems Paternal Grandfather     Cancer Brother     No Known Problems Brother     No Known Problems Brother     Thyroid disease Sister     No Known Problems Sister     No Known Problems Daughter     No Known Problems Daughter     No Known Problems Son        Social History     Socioeconomic History    Marital status:      Spouse name: Not on file    Number of children: Not on file    Years of education: Not on file    Highest education level: Not on file   Occupational History    Not on file   Social Needs    Financial resource strain: Not on file     Food insecurity:     Worry: Not on file     Inability: Not on file    Transportation needs:     Medical: Not on file     Non-medical: Not on file   Tobacco Use    Smoking status: Former Smoker     Packs/day: 2.00     Years: 30.00     Pack years: 60.00     Types: Cigarettes     Last attempt to quit:      Years since quittin.5    Smokeless tobacco: Never Used   Substance and Sexual Activity    Alcohol use: Yes     Comment: Occasionally     Drug use: Not Currently    Sexual activity: Not Currently   Lifestyle    Physical activity:     Days per week: Not on file     Minutes per session: Not on file    Stress: Not on file   Relationships    Social connections:     Talks on phone: Not on file     Gets together: Not on file     Attends Caodaism service: Not on file     Active member of club or organization: Not on file     Attends meetings of clubs or organizations: Not on file     Relationship status: Not on file   Other Topics Concern    Not on file   Social History Narrative    Not on file       Allergies:  Indomethacin submicronized; Naproxen; and Sulfa (sulfonamide antibiotics)    Medications:    Current Outpatient Medications:     acetaminophen (TYLENOL) 500 MG tablet, Take 500 mg by mouth., Disp: , Rfl:     cetirizine (ZYRTEC) 5 MG tablet, Take 5 mg by mouth., Disp: , Rfl:     ciprofloxacin HCl (CIPRO) 500 MG tablet, Take 1 tablet (500 mg total) by mouth 2 (two) times daily. for 14 days, Disp: 28 tablet, Rfl: 0    clobetasol 0.05% (TEMOVATE) 0.05 % Oint, Apply 1 application topically., Disp: , Rfl:     desonide (DESOWEN) 0.05 % cream, APPLY TOPICALLY 2 (TWO) TIMES DAILY AS NEEDED FOR ITCHING., Disp: , Rfl: 0    erythromycin (ROMYCIN) ophthalmic ointment, APPLY A SMALL AMOUNT ON EYELID TWICE A DAY USE ON EYELIDS BILATERAL AFTER COMPRESS AND LID SCRUB, Disp: , Rfl: 1    fluticasone propionate (FLONASE) 50 mcg/actuation nasal spray, 2 sprays by Nasal route., Disp: , Rfl:     latanoprost  0.005 % ophthalmic solution, 1 drop., Disp: , Rfl:     lidocaine-prilocaine (EMLA) cream, daily as needed. Apply to affected area, Disp: , Rfl: 2    montelukast (SINGULAIR) 10 mg tablet, Take 10 mg by mouth nightly., Disp: , Rfl: 1    omeprazole (PRILOSEC) 20 MG capsule, Take 20 mg by mouth once daily., Disp: , Rfl: 0    ondansetron (ZOFRAN) 4 MG tablet, Take 4 mg by mouth every 6 (six) hours as needed., Disp: , Rfl:     timolol maleate 0.5% (TIMOPTIC) 0.5 % Drop, 1 drop., Disp: , Rfl:     Review of Systems   Constitutional: Negative for activity change, appetite change, chills, diaphoresis, fatigue, fever and unexpected weight change.   HENT: Negative for congestion, dental problem, hearing loss, mouth sores, postnasal drip, rhinorrhea, sinus pressure and trouble swallowing.    Eyes: Negative for pain, discharge and itching.   Respiratory: Negative for apnea, cough, choking, chest tightness, shortness of breath and wheezing.    Cardiovascular: Negative for chest pain, palpitations and leg swelling.   Gastrointestinal: Negative for abdominal distention, abdominal pain, anal bleeding, blood in stool, constipation, diarrhea, nausea, rectal pain and vomiting.   Endocrine: Negative for polydipsia and polyuria.   Genitourinary: Positive for dysuria. Negative for decreased urine volume, difficulty urinating, dyspareunia, enuresis, flank pain, frequency, genital sores, hematuria, menstrual problem, pelvic pain and urgency.   Musculoskeletal: Negative for arthralgias, back pain and myalgias.   Skin: Negative for color change, rash and wound.   Neurological: Negative for dizziness, syncope, speech difficulty, light-headedness and headaches.   Hematological: Negative for adenopathy. Does not bruise/bleed easily.   Psychiatric/Behavioral: Negative for behavioral problems, confusion and sleep disturbance.       Objective:      Physical Exam   Constitutional: She appears well-developed.   HENT:   Head: Normocephalic.    Neck: Neck supple.   Cardiovascular: Normal rate.    Pulmonary/Chest: Effort normal.   Abdominal: Soft.   Neurological: She is alert.   Skin: Skin is warm.     Psychiatric: She has a normal mood and affect.       Assessment:       1. Dysuria        Plan:       Soni was seen today for advice only.    Diagnoses and all orders for this visit:    Dysuria  -     Urine culture; Future            Repeat Urine culture today  Recommended the patient continue AZO-standard 1 pill either BID or TID. Also explained that this medication can cause a color change in urine  Schedule cystoscope     I, Milind Bocanegra, am acting as a scribe on this patient encounter in the presence and under the supervision of Dr. Soriano.    07/01/2019 9:07 AM      I, Dr. Soriano, personally performed the services described in this documentation.   All medical record entries made by the scribe were at my direction and in my presence.   I have reviewed the chart and agree that the record is accurate and complete.   Johny Soriano MD.  9:07 AM 07/01/2019

## 2019-07-01 NOTE — TELEPHONE ENCOUNTER
"Returned call to pt.   Pt stated she went for cystoscope today and it was determined that pt has "vaginal dryness."   Dr. Soriano would like to send in an estrogen cream to help relieve but due to pt's dx of breast cancer cannot without authorization.   Pt would like to know if Dr. Cerna could discuss with Dr. Brewer ([facility]) regarding best option for a vaginal cream so she can start getting some relief.   Informed pt would fwd message to Dr. Cerna and f/u.   Pt verbalized understanding.       Message fwd.         ----- Message from Octavio Hunt sent at 7/1/2019  2:18 PM CDT -----  Contact: Patient  Pt would like a call from the nurse concerning a procedure she had with urology. Wants to know if the estrogen cream she was prescribed is safe for her to use. Please contact to advise.        Contact:: [phone]    "

## 2019-07-01 NOTE — TELEPHONE ENCOUNTER
----- Message from Mya Young sent at 7/1/2019  3:18 PM CDT -----  Contact: self [phone]  States that her pharm has not received the rx for estrogen cream. Pt uses     [id number] - [place] - [address]  [address]  [place] [address]  Phone: [phone] Fax: [phone]    Please call back at [phone]//thank you acc

## 2019-07-01 NOTE — TELEPHONE ENCOUNTER
Call to pt.   Informed pt of below:    MD Shira Ford   Caller: Unspecified (Today,  2:31 PM)             I spoke with Dr Ferro today. Please let patient know that ok from my standpoint to try the estrogens since her estrogen receptor for her breast cancer is low. I do not think she'll have any adverse effect in regards to her malignancy.Thanks     Pt verbalized understanding.

## 2019-07-02 ENCOUNTER — TELEPHONE (OUTPATIENT)
Dept: HEMATOLOGY/ONCOLOGY | Facility: CLINIC | Age: 84
End: 2019-07-02

## 2019-07-02 ENCOUNTER — LAB VISIT (OUTPATIENT)
Dept: LAB | Facility: HOSPITAL | Age: 84
End: 2019-07-02
Attending: INTERNAL MEDICINE
Payer: MEDICARE

## 2019-07-02 DIAGNOSIS — Z51.11 ENCOUNTER FOR ANTINEOPLASTIC CHEMOTHERAPY: ICD-10-CM

## 2019-07-02 PROBLEM — N95.2 VAGINAL ATROPHY: Status: ACTIVE | Noted: 2019-07-02

## 2019-07-02 LAB
ALBUMIN SERPL BCP-MCNC: 3.3 G/DL (ref 3.5–5.2)
ALP SERPL-CCNC: 201 U/L (ref 55–135)
ALT SERPL W/O P-5'-P-CCNC: 137 U/L (ref 10–44)
ANION GAP SERPL CALC-SCNC: 7 MMOL/L (ref 8–16)
AST SERPL-CCNC: 192 U/L (ref 10–40)
BILIRUB SERPL-MCNC: 0.5 MG/DL (ref 0.1–1)
BUN SERPL-MCNC: 13 MG/DL (ref 8–23)
CALCIUM SERPL-MCNC: 9.5 MG/DL (ref 8.7–10.5)
CHLORIDE SERPL-SCNC: 107 MMOL/L (ref 95–110)
CO2 SERPL-SCNC: 25 MMOL/L (ref 23–29)
CREAT SERPL-MCNC: 0.9 MG/DL (ref 0.5–1.4)
ERYTHROCYTE [DISTWIDTH] IN BLOOD BY AUTOMATED COUNT: 15.8 % (ref 11.5–14.5)
EST. GFR  (AFRICAN AMERICAN): >60 ML/MIN/1.73 M^2
EST. GFR  (NON AFRICAN AMERICAN): 59.3 ML/MIN/1.73 M^2
GLUCOSE SERPL-MCNC: 106 MG/DL (ref 70–110)
HCT VFR BLD AUTO: 33 % (ref 37–48.5)
HGB BLD-MCNC: 10.6 G/DL (ref 12–16)
IMM GRANULOCYTES # BLD AUTO: 0.03 K/UL (ref 0–0.04)
MCH RBC QN AUTO: 31.9 PG (ref 27–31)
MCHC RBC AUTO-ENTMCNC: 32.1 G/DL (ref 32–36)
MCV RBC AUTO: 99 FL (ref 82–98)
NEUTROPHILS # BLD AUTO: 1.7 K/UL (ref 1.8–7.7)
PLATELET # BLD AUTO: 308 K/UL (ref 150–350)
PMV BLD AUTO: 9.7 FL (ref 9.2–12.9)
POTASSIUM SERPL-SCNC: 4.1 MMOL/L (ref 3.5–5.1)
PROT SERPL-MCNC: 6.3 G/DL (ref 6–8.4)
RBC # BLD AUTO: 3.32 M/UL (ref 4–5.4)
SODIUM SERPL-SCNC: 139 MMOL/L (ref 136–145)
WBC # BLD AUTO: 3.4 K/UL (ref 3.9–12.7)

## 2019-07-02 PROCEDURE — 80053 COMPREHEN METABOLIC PANEL: CPT

## 2019-07-02 PROCEDURE — 85027 COMPLETE CBC AUTOMATED: CPT

## 2019-07-02 PROCEDURE — 36415 COLL VENOUS BLD VENIPUNCTURE: CPT | Mod: PN

## 2019-07-02 NOTE — TELEPHONE ENCOUNTER
Returned call to pt.   Pt stated she is upset as thought Dr. Cerna said she spoke with Dr. Ferro about ok to use estrogen cream but Dr. Ferro has not heard from Dr. Cerna.   Informed pt that Dr. Cerna at [facility] location yesterday and nurse at [facility] and Dr. Cerna had sent message informing nurse that had spoken with and apologized if lack of communication or misunderstanding.   Informed pt would send message to Dr. Ferro's nurse informing that Dr. Cerna is agreeable to pt using estrogen cream with dx of breast cancer.   Pt verbalized understanding and thanked nurse.       Message fwd.           ----- Message from Lottie Nguyen sent at 7/2/2019  8:46 AM CDT -----  Contact: Pt   Pt called to speak with nurse jaime have some questions   Callback#[phone]  Thank You  ASHLEY Nguyen

## 2019-07-02 NOTE — PROGRESS NOTES
History:     Reason for follow up:   1. 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 Follows with [facility].    * Presented with oligometastatic disease to C2 and 2.3 cm right breast mass   * status post EBRT to C2, completed 3/22/19   * Currently on weekly Abraxane days 1,8,15 every 28 days with Tecentric days 1 and 15 every 28 days. 20% dose reduction in Abraxane.    HPI:   Soni Schwartz presents for follow up of breast cancer She receives most of her care in [facility], but is planning on receiving some care in [place] while she visits family. She called the office complaining of dysuria despite treatment for UTI. Dr Soriano did flex cystoscopy which revealed atrophic vaginitis. Dr Soriano reached out earlier this week about the possibility of starting vaginal estrogens but wanted clearance from oncology before starting. Patient is due for cycle 4 day 1 Tecentriq/Abraxane. Plans for scans in [place] before cycle 4 day 8.     Patient was in hospital June 14th with right sided abdominal pain (still present). CT scans showed gallbladder wall thickening. HIDA scan without obvious inflammation/obstruction. On 6/15, , , but improved and thus received cycle 3 day 15 on 6/19.     Labs today with increased elevation again. Feels well but still had fullness in right upper quadrant. + emesis last night. Afebrile.     History: I reviewed, confirmed and updated history (past medical, social, family) as necessary.    Medications    Current Outpatient Medications:     acetaminophen (TYLENOL) 500 MG tablet, Take 500 mg by mouth., Disp: , Rfl:     cetirizine (ZYRTEC) 5 MG tablet, Take 5 mg by mouth., Disp: , Rfl:     ciprofloxacin HCl (CIPRO) 500 MG tablet, Take 1 tablet (500 mg total) by mouth 2 (two) times daily. for 14 days, Disp: 28 tablet, Rfl: 0    clobetasol 0.05% (TEMOVATE) 0.05 % Oint, Apply 1 application topically., Disp:  , Rfl:     conjugated estrogens (PREMARIN) vaginal cream, Place 0.5 g vaginally once daily., Disp: 30 g, Rfl: 3    desonide (DESOWEN) 0.05 % cream, APPLY TOPICALLY 2 (TWO) TIMES DAILY AS NEEDED FOR ITCHING., Disp: , Rfl: 0    erythromycin (ROMYCIN) ophthalmic ointment, APPLY A SMALL AMOUNT ON EYELID TWICE A DAY USE ON EYELIDS BILATERAL AFTER COMPRESS AND LID SCRUB, Disp: , Rfl: 1    fluticasone propionate (FLONASE) 50 mcg/actuation nasal spray, 2 sprays by Nasal route., Disp: , Rfl:     latanoprost 0.005 % ophthalmic solution, 1 drop., Disp: , Rfl:     lidocaine-prilocaine (EMLA) cream, daily as needed. Apply to affected area, Disp: , Rfl: 2    montelukast (SINGULAIR) 10 mg tablet, Take 10 mg by mouth nightly., Disp: , Rfl: 1    omeprazole (PRILOSEC) 20 MG capsule, Take 20 mg by mouth once daily., Disp: , Rfl: 0    ondansetron (ZOFRAN) 4 MG tablet, Take 4 mg by mouth every 6 (six) hours as needed., Disp: , Rfl:     timolol maleate 0.5% (TIMOPTIC) 0.5 % Drop, 1 drop., Disp: , Rfl:   Allergies  Review of patient's allergies indicates:   Allergen Reactions    Indomethacin submicronized Hallucinations    Naproxen Other (See Comments)     GI Upset     Sulfa (sulfonamide antibiotics) Rash     Review of Systems  Review of Systems   Constitutional: Positive for fatigue. Negative for activity change, appetite change, chills, fever and unexpected weight change.   HENT: Negative for congestion, hearing loss, nosebleeds, sinus pressure and trouble swallowing.    Eyes: Negative for pain, discharge and itching.   Respiratory: Negative for cough, chest tightness and shortness of breath.    Cardiovascular: Negative for chest pain, palpitations and leg swelling.   Gastrointestinal: Positive for abdominal pain and vomiting. Negative for abdominal distention, blood in stool, diarrhea, nausea and rectal pain.   Endocrine: Negative for heat intolerance and polydipsia.   Genitourinary: Negative for difficulty urinating,  "dysuria, flank pain, frequency, hematuria and urgency.   Musculoskeletal: Positive for neck pain. Negative for arthralgias and back pain.   Skin: Negative for color change, pallor and rash.   Neurological: Negative for dizziness, numbness and headaches.   Hematological: Negative for adenopathy. Does not bruise/bleed easily.   Psychiatric/Behavioral: Negative for confusion and decreased concentration. The patient is not nervous/anxious.         Objective     Objective:     Vitals:    07/03/19 1302   BP: 126/78   BP Location: Right arm   Patient Position: Sitting   Pulse: 78   Resp: 20   Temp: 97.6 °F (36.4 °C)   TempSrc: Oral   Weight: 66.5 kg (146 lb 9.7 oz)   Height: 5' 8" (1.727 m)     Body surface area is 1.79 meters squared.  Physical Exam   Constitutional: She is oriented to person, place, and time. She appears well-developed and well-nourished. No distress.   HENT:   Head: Normocephalic and atraumatic.   Mouth/Throat: Oropharynx is clear and moist and mucous membranes are normal. No oral lesions.   Eyes: Conjunctivae and EOM are normal. Right eye exhibits no discharge. Left eye exhibits no discharge. No scleral icterus.   Neck: Neck supple.   Cardiovascular: Normal rate, regular rhythm and normal heart sounds.   No murmur heard.  Pulmonary/Chest: Effort normal and breath sounds normal. No respiratory distress. She has no wheezes. She has no rhonchi. She has no rales. Chest wall is not dull to percussion.   mediport right chest wall   Abdominal: Soft. Normal appearance and bowel sounds are normal. There is no tenderness.   Neurological: She is alert and oriented to person, place, and time.   Skin: Skin is warm, dry and intact. No pallor.   Psychiatric: Her behavior is normal. Thought content normal.   Nursing note and vitals reviewed.      Labs and Imaging  Results for orders placed or performed in visit on 07/02/19   CBC Oncology   Result Value Ref Range    WBC 3.40 (L) 3.90 - 12.70 K/uL    RBC 3.32 (L) 4.00 - " 5.40 M/uL    Hemoglobin 10.6 (L) 12.0 - 16.0 g/dL    Hematocrit 33.0 (L) 37.0 - 48.5 %    Mean Corpuscular Volume 99 (H) 82 - 98 fL    Mean Corpuscular Hemoglobin 31.9 (H) 27.0 - 31.0 pg    Mean Corpuscular Hemoglobin Conc 32.1 32.0 - 36.0 g/dL    RDW 15.8 (H) 11.5 - 14.5 %    Platelets 308 150 - 350 K/uL    MPV 9.7 9.2 - 12.9 fL    Gran # (ANC) 1.7 (L) 1.8 - 7.7 K/uL    Immature Grans (Abs) 0.03 0.00 - 0.04 K/uL   Comprehensive metabolic panel   Result Value Ref Range    Sodium 139 136 - 145 mmol/L    Potassium 4.1 3.5 - 5.1 mmol/L    Chloride 107 95 - 110 mmol/L    CO2 25 23 - 29 mmol/L    Glucose 106 70 - 110 mg/dL    BUN, Bld 13 8 - 23 mg/dL    Creatinine 0.9 0.5 - 1.4 mg/dL    Calcium 9.5 8.7 - 10.5 mg/dL    Total Protein 6.3 6.0 - 8.4 g/dL    Albumin 3.3 (L) 3.5 - 5.2 g/dL    Total Bilirubin 0.5 0.1 - 1.0 mg/dL    Alkaline Phosphatase 201 (H) 55 - 135 U/L     (H) 10 - 40 U/L     (H) 10 - 44 U/L    Anion Gap 7 (L) 8 - 16 mmol/L    eGFR if African American >60.0 >60 mL/min/1.73 m^2    eGFR if non  59.3 (A) >60 mL/min/1.73 m^2         Assessment     Assessment:     1. 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 Follows with [facility].    * Presented with oligometastatic disease to C2 and 2.3 cm right breast mass   * status post EBRT to C2, completed 3/22/19   * Currently on weekly Abraxane days 1,8,15 every 28 days with Tecentric days 1 and 15 every 28 days. 20% dose reduction in Abraxane.   * Follows with Dr Celeste Brewer in [place] but is spending some time with her daughter in [place] thus desires to receive some treatments in [place], thus we will be working in conjunction with Dr Brewer. Scans and major treatment decisions will be done in [place]. Dr Brewer's plan is to do at least 4-6 cycles of chemo/immunotherapy and if no progression, then consider stopping Abraxane and continue with  immunotherapy.    * After 2.5 cycles, improvement in breast mass but possible progression of C2. Plan repeat imaging after completed 3 cycles.    * Have to hold treatment today due to transaminitis. She will follow up with Dr Brewer next week.     2. Vaginal atrophy causing significant dysuria   * Appreciate Dr Soriano assistance   * I feel it is reasonable/acceptable to use vaginal estrogen in [place] as her tumor is only very mildly estrogen receptor positive thus I do not think she will have a negative effect on outcome from the low dose localized estrogens and I think it may improve her quality of life.    3. Chemo neutropenia   * resolved with dose reduction    4. Bone mets   * On Xgeva with Dr Brewer    5. Transaminitis/hepatitis.    * Progressive and I suspect from immunotherapy.    * Hold treatment today; start prednisone 1 mg/kg - 60 mg daily - taper with Dr Brewer.     Plan:     1. Cancel treatment  2. Start prednisone.   3. Discussed case with Rajni, the NP that works with Dr Brewer.   4. To ER if progressive GI symptoms.    Follow-up when treatment needed in town. I asked the patient to call for any questions, concerns, or new symptoms.    Distress Screening Results: Psychosocial Distress screening score of Distress Score: 0 noted and reviewed. No intervention indicated.   Outside records reviewed, discussed case with two providers.

## 2019-07-03 ENCOUNTER — OFFICE VISIT (OUTPATIENT)
Dept: HEMATOLOGY/ONCOLOGY | Facility: CLINIC | Age: 84
End: 2019-07-03
Payer: MEDICARE

## 2019-07-03 VITALS
TEMPERATURE: 98 F | RESPIRATION RATE: 20 BRPM | BODY MASS INDEX: 22.22 KG/M2 | HEART RATE: 78 BPM | DIASTOLIC BLOOD PRESSURE: 78 MMHG | HEIGHT: 68 IN | SYSTOLIC BLOOD PRESSURE: 126 MMHG | WEIGHT: 146.63 LBS

## 2019-07-03 DIAGNOSIS — D70.1 CHEMOTHERAPY INDUCED NEUTROPENIA: ICD-10-CM

## 2019-07-03 DIAGNOSIS — K75.9 HEPATITIS: ICD-10-CM

## 2019-07-03 DIAGNOSIS — C50.111 MALIGNANT NEOPLASM OF CENTRAL PORTION OF RIGHT BREAST IN FEMALE, ESTROGEN RECEPTOR POSITIVE: Primary | ICD-10-CM

## 2019-07-03 DIAGNOSIS — T45.1X5A CHEMOTHERAPY INDUCED NEUTROPENIA: ICD-10-CM

## 2019-07-03 DIAGNOSIS — N95.2 VAGINAL ATROPHY: ICD-10-CM

## 2019-07-03 DIAGNOSIS — Z17.0 MALIGNANT NEOPLASM OF CENTRAL PORTION OF RIGHT BREAST IN FEMALE, ESTROGEN RECEPTOR POSITIVE: Primary | ICD-10-CM

## 2019-07-03 DIAGNOSIS — C79.51 BONE METASTASES: ICD-10-CM

## 2019-07-03 PROCEDURE — 3078F DIAST BP <80 MM HG: CPT | Mod: CPTII,S$GLB,, | Performed by: INTERNAL MEDICINE

## 2019-07-03 PROCEDURE — 3078F PR MOST RECENT DIASTOLIC BLOOD PRESSURE < 80 MM HG: ICD-10-PCS | Mod: CPTII,S$GLB,, | Performed by: INTERNAL MEDICINE

## 2019-07-03 PROCEDURE — 99999 PR PBB SHADOW E&M-EST. PATIENT-LVL III: CPT | Mod: PBBFAC,,, | Performed by: INTERNAL MEDICINE

## 2019-07-03 PROCEDURE — 99999 PR PBB SHADOW E&M-EST. PATIENT-LVL III: ICD-10-PCS | Mod: PBBFAC,,, | Performed by: INTERNAL MEDICINE

## 2019-07-03 PROCEDURE — 3074F SYST BP LT 130 MM HG: CPT | Mod: CPTII,S$GLB,, | Performed by: INTERNAL MEDICINE

## 2019-07-03 PROCEDURE — 1101F PR PT FALLS ASSESS DOC 0-1 FALLS W/OUT INJ PAST YR: ICD-10-PCS | Mod: CPTII,S$GLB,, | Performed by: INTERNAL MEDICINE

## 2019-07-03 PROCEDURE — 99215 PR OFFICE/OUTPT VISIT, EST, LEVL V, 40-54 MIN: ICD-10-PCS | Mod: S$GLB,,, | Performed by: INTERNAL MEDICINE

## 2019-07-03 PROCEDURE — 3074F PR MOST RECENT SYSTOLIC BLOOD PRESSURE < 130 MM HG: ICD-10-PCS | Mod: CPTII,S$GLB,, | Performed by: INTERNAL MEDICINE

## 2019-07-03 PROCEDURE — 99215 OFFICE O/P EST HI 40 MIN: CPT | Mod: S$GLB,,, | Performed by: INTERNAL MEDICINE

## 2019-07-03 PROCEDURE — 1101F PT FALLS ASSESS-DOCD LE1/YR: CPT | Mod: CPTII,S$GLB,, | Performed by: INTERNAL MEDICINE

## 2019-07-03 RX ORDER — PREDNISONE 20 MG/1
60 TABLET ORAL DAILY
Qty: 30 TABLET | Refills: 0 | Status: SHIPPED | OUTPATIENT
Start: 2019-07-03 | End: 2019-07-13

## 2019-08-08 ENCOUNTER — PATIENT MESSAGE (OUTPATIENT)
Dept: HEMATOLOGY/ONCOLOGY | Facility: CLINIC | Age: 84
End: 2019-08-08

## 2019-08-12 ENCOUNTER — PATIENT MESSAGE (OUTPATIENT)
Dept: HEMATOLOGY/ONCOLOGY | Facility: CLINIC | Age: 84
End: 2019-08-12

## 2019-08-19 ENCOUNTER — LAB VISIT (OUTPATIENT)
Dept: LAB | Facility: HOSPITAL | Age: 84
End: 2019-08-19
Attending: INTERNAL MEDICINE
Payer: MEDICARE

## 2019-08-19 DIAGNOSIS — N30.01 ACUTE CYSTITIS WITH HEMATURIA: ICD-10-CM

## 2019-08-19 LAB
ALBUMIN SERPL BCP-MCNC: 3.5 G/DL (ref 3.5–5.2)
ALP SERPL-CCNC: 142 U/L (ref 55–135)
ALT SERPL W/O P-5'-P-CCNC: 88 U/L (ref 10–44)
ANION GAP SERPL CALC-SCNC: 9 MMOL/L (ref 8–16)
AST SERPL-CCNC: 87 U/L (ref 10–40)
BILIRUB SERPL-MCNC: 0.4 MG/DL (ref 0.1–1)
BUN SERPL-MCNC: 20 MG/DL (ref 8–23)
CALCIUM SERPL-MCNC: 9.9 MG/DL (ref 8.7–10.5)
CHLORIDE SERPL-SCNC: 103 MMOL/L (ref 95–110)
CO2 SERPL-SCNC: 25 MMOL/L (ref 23–29)
CREAT SERPL-MCNC: 0.9 MG/DL (ref 0.5–1.4)
EST. GFR  (AFRICAN AMERICAN): >60 ML/MIN/1.73 M^2
EST. GFR  (NON AFRICAN AMERICAN): 59.3 ML/MIN/1.73 M^2
GLUCOSE SERPL-MCNC: 100 MG/DL (ref 70–110)
POTASSIUM SERPL-SCNC: 4.6 MMOL/L (ref 3.5–5.1)
PROT SERPL-MCNC: 6.7 G/DL (ref 6–8.4)
SODIUM SERPL-SCNC: 137 MMOL/L (ref 136–145)

## 2019-08-19 PROCEDURE — 80053 COMPREHEN METABOLIC PANEL: CPT

## 2019-08-19 PROCEDURE — 36415 COLL VENOUS BLD VENIPUNCTURE: CPT | Mod: PN

## 2019-09-05 ENCOUNTER — LAB VISIT (OUTPATIENT)
Dept: LAB | Facility: HOSPITAL | Age: 84
End: 2019-09-05
Attending: INTERNAL MEDICINE
Payer: MEDICARE

## 2019-09-05 DIAGNOSIS — N30.01 ACUTE CYSTITIS WITH HEMATURIA: ICD-10-CM

## 2019-09-05 LAB
ALBUMIN SERPL BCP-MCNC: 3.7 G/DL (ref 3.5–5.2)
ALP SERPL-CCNC: 83 U/L (ref 55–135)
ALT SERPL W/O P-5'-P-CCNC: 35 U/L (ref 10–44)
ANION GAP SERPL CALC-SCNC: 9 MMOL/L (ref 8–16)
AST SERPL-CCNC: 30 U/L (ref 10–40)
BILIRUB SERPL-MCNC: 0.5 MG/DL (ref 0.1–1)
BUN SERPL-MCNC: 19 MG/DL (ref 8–23)
CALCIUM SERPL-MCNC: 9.8 MG/DL (ref 8.7–10.5)
CHLORIDE SERPL-SCNC: 106 MMOL/L (ref 95–110)
CO2 SERPL-SCNC: 24 MMOL/L (ref 23–29)
CREAT SERPL-MCNC: 1.1 MG/DL (ref 0.5–1.4)
EST. GFR  (AFRICAN AMERICAN): 53.7 ML/MIN/1.73 M^2
EST. GFR  (NON AFRICAN AMERICAN): 46.5 ML/MIN/1.73 M^2
GLUCOSE SERPL-MCNC: 119 MG/DL (ref 70–110)
POTASSIUM SERPL-SCNC: 5 MMOL/L (ref 3.5–5.1)
PROT SERPL-MCNC: 6.6 G/DL (ref 6–8.4)
SODIUM SERPL-SCNC: 139 MMOL/L (ref 136–145)

## 2019-09-05 PROCEDURE — 80053 COMPREHEN METABOLIC PANEL: CPT

## 2019-09-05 PROCEDURE — 36415 COLL VENOUS BLD VENIPUNCTURE: CPT | Mod: PN

## 2019-09-12 ENCOUNTER — PATIENT MESSAGE (OUTPATIENT)
Dept: HEMATOLOGY/ONCOLOGY | Facility: CLINIC | Age: 84
End: 2019-09-12

## 2019-10-01 ENCOUNTER — LAB VISIT (OUTPATIENT)
Dept: LAB | Facility: HOSPITAL | Age: 84
End: 2019-10-01
Attending: INTERNAL MEDICINE
Payer: MEDICARE

## 2019-10-01 DIAGNOSIS — N30.01 ACUTE CYSTITIS WITH HEMATURIA: ICD-10-CM

## 2019-10-01 PROCEDURE — 85027 COMPLETE CBC AUTOMATED: CPT

## 2019-10-01 PROCEDURE — 36415 COLL VENOUS BLD VENIPUNCTURE: CPT | Mod: PN

## 2019-10-01 PROCEDURE — 80053 COMPREHEN METABOLIC PANEL: CPT

## 2019-10-02 ENCOUNTER — PATIENT MESSAGE (OUTPATIENT)
Dept: HEMATOLOGY/ONCOLOGY | Facility: CLINIC | Age: 84
End: 2019-10-02

## 2019-10-02 LAB
ALBUMIN SERPL BCP-MCNC: 2.4 G/DL (ref 3.5–5.2)
ALP SERPL-CCNC: 147 U/L (ref 55–135)
ALT SERPL W/O P-5'-P-CCNC: 113 U/L (ref 10–44)
ANION GAP SERPL CALC-SCNC: 11 MMOL/L (ref 8–16)
AST SERPL-CCNC: 93 U/L (ref 10–40)
BILIRUB SERPL-MCNC: 0.4 MG/DL (ref 0.1–1)
BUN SERPL-MCNC: 55 MG/DL (ref 8–23)
CALCIUM SERPL-MCNC: 9.2 MG/DL (ref 8.7–10.5)
CHLORIDE SERPL-SCNC: 101 MMOL/L (ref 95–110)
CO2 SERPL-SCNC: 22 MMOL/L (ref 23–29)
CREAT SERPL-MCNC: 1.4 MG/DL (ref 0.5–1.4)
ERYTHROCYTE [DISTWIDTH] IN BLOOD BY AUTOMATED COUNT: 15.7 % (ref 11.5–14.5)
EST. GFR  (AFRICAN AMERICAN): 40.1 ML/MIN/1.73 M^2
EST. GFR  (NON AFRICAN AMERICAN): 34.8 ML/MIN/1.73 M^2
GLUCOSE SERPL-MCNC: 113 MG/DL (ref 70–110)
HCT VFR BLD AUTO: 40.5 % (ref 37–48.5)
HGB BLD-MCNC: 12.4 G/DL (ref 12–16)
IMM GRANULOCYTES # BLD AUTO: 2.41 K/UL (ref 0–0.04)
MCH RBC QN AUTO: 30.9 PG (ref 27–31)
MCHC RBC AUTO-ENTMCNC: 30.6 G/DL (ref 32–36)
MCV RBC AUTO: 101 FL (ref 82–98)
NEUTROPHILS # BLD AUTO: 29.7 K/UL (ref 1.8–7.7)
PLATELET # BLD AUTO: 275 K/UL (ref 150–350)
PMV BLD AUTO: 9.8 FL (ref 9.2–12.9)
POTASSIUM SERPL-SCNC: 4.8 MMOL/L (ref 3.5–5.1)
PROT SERPL-MCNC: 6.2 G/DL (ref 6–8.4)
RBC # BLD AUTO: 4.01 M/UL (ref 4–5.4)
SODIUM SERPL-SCNC: 134 MMOL/L (ref 136–145)
WBC # BLD AUTO: 33.53 K/UL (ref 3.9–12.7)

## 2019-10-08 ENCOUNTER — LAB VISIT (OUTPATIENT)
Dept: LAB | Facility: HOSPITAL | Age: 84
End: 2019-10-08
Attending: INTERNAL MEDICINE
Payer: MEDICARE

## 2019-10-08 DIAGNOSIS — N30.01 ACUTE CYSTITIS WITH HEMATURIA: ICD-10-CM

## 2019-10-08 LAB
ALBUMIN SERPL BCP-MCNC: 2.7 G/DL (ref 3.5–5.2)
ALP SERPL-CCNC: 109 U/L (ref 55–135)
ALT SERPL W/O P-5'-P-CCNC: 152 U/L (ref 10–44)
ANION GAP SERPL CALC-SCNC: 7 MMOL/L (ref 8–16)
AST SERPL-CCNC: 54 U/L (ref 10–40)
BILIRUB SERPL-MCNC: 0.5 MG/DL (ref 0.1–1)
BUN SERPL-MCNC: 45 MG/DL (ref 8–23)
CALCIUM SERPL-MCNC: 9.4 MG/DL (ref 8.7–10.5)
CHLORIDE SERPL-SCNC: 104 MMOL/L (ref 95–110)
CO2 SERPL-SCNC: 26 MMOL/L (ref 23–29)
CREAT SERPL-MCNC: 1.3 MG/DL (ref 0.5–1.4)
ERYTHROCYTE [DISTWIDTH] IN BLOOD BY AUTOMATED COUNT: 15.5 % (ref 11.5–14.5)
EST. GFR  (AFRICAN AMERICAN): 43.8 ML/MIN/1.73 M^2
EST. GFR  (NON AFRICAN AMERICAN): 38 ML/MIN/1.73 M^2
GLUCOSE SERPL-MCNC: 79 MG/DL (ref 70–110)
HCT VFR BLD AUTO: 40.1 % (ref 37–48.5)
HGB BLD-MCNC: 12.4 G/DL (ref 12–16)
IMM GRANULOCYTES # BLD AUTO: 0.37 K/UL (ref 0–0.04)
MCH RBC QN AUTO: 30.8 PG (ref 27–31)
MCHC RBC AUTO-ENTMCNC: 30.9 G/DL (ref 32–36)
MCV RBC AUTO: 100 FL (ref 82–98)
NEUTROPHILS # BLD AUTO: 21.5 K/UL (ref 1.8–7.7)
PLATELET # BLD AUTO: 358 K/UL (ref 150–350)
PMV BLD AUTO: 9.7 FL (ref 9.2–12.9)
POTASSIUM SERPL-SCNC: 4.4 MMOL/L (ref 3.5–5.1)
PROT SERPL-MCNC: 6.1 G/DL (ref 6–8.4)
RBC # BLD AUTO: 4.03 M/UL (ref 4–5.4)
SODIUM SERPL-SCNC: 137 MMOL/L (ref 136–145)
WBC # BLD AUTO: 23.74 K/UL (ref 3.9–12.7)

## 2019-10-08 PROCEDURE — 85027 COMPLETE CBC AUTOMATED: CPT

## 2019-10-08 PROCEDURE — 80053 COMPREHEN METABOLIC PANEL: CPT

## 2019-10-08 PROCEDURE — 36415 COLL VENOUS BLD VENIPUNCTURE: CPT | Mod: PN

## 2019-10-09 ENCOUNTER — PATIENT MESSAGE (OUTPATIENT)
Dept: HEMATOLOGY/ONCOLOGY | Facility: CLINIC | Age: 84
End: 2019-10-09

## 2019-11-13 ENCOUNTER — PATIENT MESSAGE (OUTPATIENT)
Dept: HEMATOLOGY/ONCOLOGY | Facility: CLINIC | Age: 84
End: 2019-11-13

## 2019-11-13 DIAGNOSIS — C50.111 MALIGNANT NEOPLASM OF CENTRAL PORTION OF RIGHT BREAST IN FEMALE, ESTROGEN RECEPTOR POSITIVE: Primary | ICD-10-CM

## 2019-11-13 DIAGNOSIS — Z17.0 MALIGNANT NEOPLASM OF CENTRAL PORTION OF RIGHT BREAST IN FEMALE, ESTROGEN RECEPTOR POSITIVE: Primary | ICD-10-CM

## 2019-11-19 ENCOUNTER — LAB VISIT (OUTPATIENT)
Dept: LAB | Facility: HOSPITAL | Age: 84
End: 2019-11-19
Attending: INTERNAL MEDICINE
Payer: MEDICARE

## 2019-11-19 DIAGNOSIS — N30.01 ACUTE CYSTITIS WITH HEMATURIA: ICD-10-CM

## 2019-11-19 LAB
ALBUMIN SERPL BCP-MCNC: 2.8 G/DL (ref 3.5–5.2)
ALP SERPL-CCNC: 119 U/L (ref 55–135)
ALT SERPL W/O P-5'-P-CCNC: 38 U/L (ref 10–44)
ANION GAP SERPL CALC-SCNC: 8 MMOL/L (ref 8–16)
AST SERPL-CCNC: 28 U/L (ref 10–40)
BILIRUB SERPL-MCNC: 0.6 MG/DL (ref 0.1–1)
BUN SERPL-MCNC: 27 MG/DL (ref 8–23)
CALCIUM SERPL-MCNC: 8.7 MG/DL (ref 8.7–10.5)
CHLORIDE SERPL-SCNC: 104 MMOL/L (ref 95–110)
CO2 SERPL-SCNC: 24 MMOL/L (ref 23–29)
CREAT SERPL-MCNC: 1.4 MG/DL (ref 0.5–1.4)
ERYTHROCYTE [DISTWIDTH] IN BLOOD BY AUTOMATED COUNT: 15.7 % (ref 11.5–14.5)
EST. GFR  (AFRICAN AMERICAN): 40.1 ML/MIN/1.73 M^2
EST. GFR  (NON AFRICAN AMERICAN): 34.8 ML/MIN/1.73 M^2
GLUCOSE SERPL-MCNC: 122 MG/DL (ref 70–110)
HCT VFR BLD AUTO: 36.3 % (ref 37–48.5)
HGB BLD-MCNC: 11.6 G/DL (ref 12–16)
IMM GRANULOCYTES # BLD AUTO: 0.34 K/UL (ref 0–0.04)
MCH RBC QN AUTO: 30.8 PG (ref 27–31)
MCHC RBC AUTO-ENTMCNC: 32 G/DL (ref 32–36)
MCV RBC AUTO: 96 FL (ref 82–98)
NEUTROPHILS # BLD AUTO: 13.2 K/UL (ref 1.8–7.7)
PLATELET # BLD AUTO: 232 K/UL (ref 150–350)
PMV BLD AUTO: 9.4 FL (ref 9.2–12.9)
POTASSIUM SERPL-SCNC: 5.1 MMOL/L (ref 3.5–5.1)
PROT SERPL-MCNC: 6.1 G/DL (ref 6–8.4)
RBC # BLD AUTO: 3.77 M/UL (ref 4–5.4)
SODIUM SERPL-SCNC: 136 MMOL/L (ref 136–145)
WBC # BLD AUTO: 14.59 K/UL (ref 3.9–12.7)

## 2019-11-19 PROCEDURE — 36415 COLL VENOUS BLD VENIPUNCTURE: CPT | Mod: PN

## 2019-11-19 PROCEDURE — 80053 COMPREHEN METABOLIC PANEL: CPT

## 2019-11-19 PROCEDURE — 85027 COMPLETE CBC AUTOMATED: CPT

## 2019-11-26 ENCOUNTER — LAB VISIT (OUTPATIENT)
Dept: LAB | Facility: HOSPITAL | Age: 84
End: 2019-11-26
Attending: INTERNAL MEDICINE
Payer: MEDICARE

## 2019-11-26 DIAGNOSIS — N30.01 ACUTE CYSTITIS WITH HEMATURIA: ICD-10-CM

## 2019-11-26 LAB
ALBUMIN SERPL BCP-MCNC: 2.9 G/DL (ref 3.5–5.2)
ALP SERPL-CCNC: 118 U/L (ref 55–135)
ALT SERPL W/O P-5'-P-CCNC: 47 U/L (ref 10–44)
ANION GAP SERPL CALC-SCNC: 7 MMOL/L (ref 8–16)
AST SERPL-CCNC: 36 U/L (ref 10–40)
BILIRUB SERPL-MCNC: 0.5 MG/DL (ref 0.1–1)
BUN SERPL-MCNC: 29 MG/DL (ref 8–23)
CALCIUM SERPL-MCNC: 9.4 MG/DL (ref 8.7–10.5)
CHLORIDE SERPL-SCNC: 103 MMOL/L (ref 95–110)
CO2 SERPL-SCNC: 26 MMOL/L (ref 23–29)
CREAT SERPL-MCNC: 1.7 MG/DL (ref 0.5–1.4)
ERYTHROCYTE [DISTWIDTH] IN BLOOD BY AUTOMATED COUNT: 15.8 % (ref 11.5–14.5)
EST. GFR  (AFRICAN AMERICAN): 31.7 ML/MIN/1.73 M^2
EST. GFR  (NON AFRICAN AMERICAN): 27.5 ML/MIN/1.73 M^2
GLUCOSE SERPL-MCNC: 132 MG/DL (ref 70–110)
HCT VFR BLD AUTO: 37.7 % (ref 37–48.5)
HGB BLD-MCNC: 11.8 G/DL (ref 12–16)
IMM GRANULOCYTES # BLD AUTO: 0.39 K/UL (ref 0–0.04)
MCH RBC QN AUTO: 30.9 PG (ref 27–31)
MCHC RBC AUTO-ENTMCNC: 31.3 G/DL (ref 32–36)
MCV RBC AUTO: 99 FL (ref 82–98)
NEUTROPHILS # BLD AUTO: 9.2 K/UL (ref 1.8–7.7)
PLATELET # BLD AUTO: 237 K/UL (ref 150–350)
PMV BLD AUTO: 9.2 FL (ref 9.2–12.9)
POTASSIUM SERPL-SCNC: 4.9 MMOL/L (ref 3.5–5.1)
PROT SERPL-MCNC: 6.2 G/DL (ref 6–8.4)
RBC # BLD AUTO: 3.82 M/UL (ref 4–5.4)
SODIUM SERPL-SCNC: 136 MMOL/L (ref 136–145)
WBC # BLD AUTO: 10.88 K/UL (ref 3.9–12.7)

## 2019-11-26 PROCEDURE — 36415 COLL VENOUS BLD VENIPUNCTURE: CPT | Mod: PN

## 2019-11-26 PROCEDURE — 80053 COMPREHEN METABOLIC PANEL: CPT

## 2019-11-26 PROCEDURE — 85027 COMPLETE CBC AUTOMATED: CPT

## 2019-11-27 ENCOUNTER — TELEPHONE (OUTPATIENT)
Dept: HEMATOLOGY/ONCOLOGY | Facility: CLINIC | Age: 84
End: 2019-11-27

## 2019-11-27 NOTE — TELEPHONE ENCOUNTER
Returned call to pt.   Informed pt that Cr level elevated at 1.7.   Pt denies any new medication usage and has been drinking fluids.   Pt stated will f/u with PCP regarding labs and verbalized understanding.         ----- Message from Natalia Cerna MD sent at 11/27/2019  8:25 AM CST -----  Shira - will you call patient and let her know that her kidney number is slightly elevated and see if she's started any new medications. She should touch base with her PCP and let him/her know about these labs.     Thanks,   Natalia

## 2019-12-03 ENCOUNTER — LAB VISIT (OUTPATIENT)
Dept: LAB | Facility: HOSPITAL | Age: 84
End: 2019-12-03
Attending: INTERNAL MEDICINE
Payer: MEDICARE

## 2019-12-03 DIAGNOSIS — N30.01 ACUTE CYSTITIS WITH HEMATURIA: ICD-10-CM

## 2019-12-03 LAB
ALBUMIN SERPL BCP-MCNC: 3.2 G/DL (ref 3.5–5.2)
ALP SERPL-CCNC: 129 U/L (ref 55–135)
ALT SERPL W/O P-5'-P-CCNC: 39 U/L (ref 10–44)
ANION GAP SERPL CALC-SCNC: 8 MMOL/L (ref 8–16)
AST SERPL-CCNC: 36 U/L (ref 10–40)
BILIRUB SERPL-MCNC: 0.4 MG/DL (ref 0.1–1)
BUN SERPL-MCNC: 26 MG/DL (ref 8–23)
CALCIUM SERPL-MCNC: 9.5 MG/DL (ref 8.7–10.5)
CHLORIDE SERPL-SCNC: 105 MMOL/L (ref 95–110)
CO2 SERPL-SCNC: 25 MMOL/L (ref 23–29)
CREAT SERPL-MCNC: 1.3 MG/DL (ref 0.5–1.4)
ERYTHROCYTE [DISTWIDTH] IN BLOOD BY AUTOMATED COUNT: 16.2 % (ref 11.5–14.5)
EST. GFR  (AFRICAN AMERICAN): 43.8 ML/MIN/1.73 M^2
EST. GFR  (NON AFRICAN AMERICAN): 38 ML/MIN/1.73 M^2
GLUCOSE SERPL-MCNC: 111 MG/DL (ref 70–110)
HCT VFR BLD AUTO: 39.1 % (ref 37–48.5)
HGB BLD-MCNC: 12.1 G/DL (ref 12–16)
IMM GRANULOCYTES # BLD AUTO: 0.42 K/UL (ref 0–0.04)
MCH RBC QN AUTO: 30.9 PG (ref 27–31)
MCHC RBC AUTO-ENTMCNC: 30.9 G/DL (ref 32–36)
MCV RBC AUTO: 100 FL (ref 82–98)
NEUTROPHILS # BLD AUTO: 11.3 K/UL (ref 1.8–7.7)
PLATELET # BLD AUTO: 235 K/UL (ref 150–350)
PMV BLD AUTO: 9.3 FL (ref 9.2–12.9)
POTASSIUM SERPL-SCNC: 4.7 MMOL/L (ref 3.5–5.1)
PROT SERPL-MCNC: 6.6 G/DL (ref 6–8.4)
RBC # BLD AUTO: 3.91 M/UL (ref 4–5.4)
SODIUM SERPL-SCNC: 138 MMOL/L (ref 136–145)
WBC # BLD AUTO: 13.37 K/UL (ref 3.9–12.7)

## 2019-12-03 PROCEDURE — 80053 COMPREHEN METABOLIC PANEL: CPT

## 2019-12-03 PROCEDURE — 36415 COLL VENOUS BLD VENIPUNCTURE: CPT | Mod: PN

## 2019-12-03 PROCEDURE — 85027 COMPLETE CBC AUTOMATED: CPT

## 2020-02-10 ENCOUNTER — TELEPHONE (OUTPATIENT)
Dept: HEMATOLOGY/ONCOLOGY | Facility: CLINIC | Age: 85
End: 2020-02-10

## 2020-02-10 NOTE — TELEPHONE ENCOUNTER
Returned call to pt.   Pt calling to get CBC/CMP scheduled on 2/11 and 2/18 at [facility].   Informed pt would send message to  to set up.   Pt verbalized understanding.       Message fwd.       ----- Message from Cyrus Peguero sent at 2/10/2020 10:55 AM CST -----  Contact: pt  Pt would a call back regarding blood work, hepatic function panel for 2/11/20 & 2/18/20.    [place] and both offices are familiar with what to do.    Pt would like a call back at [phone].

## 2020-02-11 ENCOUNTER — LAB VISIT (OUTPATIENT)
Dept: LAB | Facility: HOSPITAL | Age: 85
End: 2020-02-11
Attending: INTERNAL MEDICINE
Payer: MEDICARE

## 2020-02-11 DIAGNOSIS — N30.01 ACUTE CYSTITIS WITH HEMATURIA: ICD-10-CM

## 2020-02-11 LAB
ALBUMIN SERPL BCP-MCNC: 3.6 G/DL (ref 3.5–5.2)
ALP SERPL-CCNC: 143 U/L (ref 55–135)
ALT SERPL W/O P-5'-P-CCNC: 44 U/L (ref 10–44)
ANION GAP SERPL CALC-SCNC: 7 MMOL/L (ref 8–16)
AST SERPL-CCNC: 35 U/L (ref 10–40)
BILIRUB SERPL-MCNC: 0.5 MG/DL (ref 0.1–1)
BUN SERPL-MCNC: 18 MG/DL (ref 8–23)
CALCIUM SERPL-MCNC: 9.8 MG/DL (ref 8.7–10.5)
CHLORIDE SERPL-SCNC: 105 MMOL/L (ref 95–110)
CO2 SERPL-SCNC: 27 MMOL/L (ref 23–29)
CREAT SERPL-MCNC: 1.3 MG/DL (ref 0.5–1.4)
ERYTHROCYTE [DISTWIDTH] IN BLOOD BY AUTOMATED COUNT: 14.9 % (ref 11.5–14.5)
EST. GFR  (AFRICAN AMERICAN): 43.8 ML/MIN/1.73 M^2
EST. GFR  (NON AFRICAN AMERICAN): 38 ML/MIN/1.73 M^2
GLUCOSE SERPL-MCNC: 111 MG/DL (ref 70–110)
HCT VFR BLD AUTO: 41.2 % (ref 37–48.5)
HGB BLD-MCNC: 12.7 G/DL (ref 12–16)
IMM GRANULOCYTES # BLD AUTO: 0.06 K/UL (ref 0–0.04)
MCH RBC QN AUTO: 31 PG (ref 27–31)
MCHC RBC AUTO-ENTMCNC: 30.8 G/DL (ref 32–36)
MCV RBC AUTO: 101 FL (ref 82–98)
NEUTROPHILS # BLD AUTO: 7 K/UL (ref 1.8–7.7)
PLATELET # BLD AUTO: 243 K/UL (ref 150–350)
PMV BLD AUTO: 9.2 FL (ref 9.2–12.9)
POTASSIUM SERPL-SCNC: 4.8 MMOL/L (ref 3.5–5.1)
PROT SERPL-MCNC: 6.7 G/DL (ref 6–8.4)
RBC # BLD AUTO: 4.1 M/UL (ref 4–5.4)
SODIUM SERPL-SCNC: 139 MMOL/L (ref 136–145)
WBC # BLD AUTO: 8.83 K/UL (ref 3.9–12.7)

## 2020-02-11 PROCEDURE — 85027 COMPLETE CBC AUTOMATED: CPT

## 2020-02-11 PROCEDURE — 80053 COMPREHEN METABOLIC PANEL: CPT

## 2020-02-11 PROCEDURE — 36415 COLL VENOUS BLD VENIPUNCTURE: CPT | Mod: PN

## 2020-02-18 ENCOUNTER — LAB VISIT (OUTPATIENT)
Dept: LAB | Facility: HOSPITAL | Age: 85
End: 2020-02-18
Attending: INTERNAL MEDICINE
Payer: MEDICARE

## 2020-02-18 DIAGNOSIS — N30.01 ACUTE CYSTITIS WITH HEMATURIA: ICD-10-CM

## 2020-02-18 LAB
ALBUMIN SERPL BCP-MCNC: 3.5 G/DL (ref 3.5–5.2)
ALP SERPL-CCNC: 140 U/L (ref 55–135)
ALT SERPL W/O P-5'-P-CCNC: 54 U/L (ref 10–44)
ANION GAP SERPL CALC-SCNC: 7 MMOL/L (ref 8–16)
AST SERPL-CCNC: 62 U/L (ref 10–40)
BILIRUB SERPL-MCNC: 0.4 MG/DL (ref 0.1–1)
BUN SERPL-MCNC: 15 MG/DL (ref 8–23)
CALCIUM SERPL-MCNC: 9.7 MG/DL (ref 8.7–10.5)
CHLORIDE SERPL-SCNC: 107 MMOL/L (ref 95–110)
CO2 SERPL-SCNC: 29 MMOL/L (ref 23–29)
CREAT SERPL-MCNC: 1.2 MG/DL (ref 0.5–1.4)
ERYTHROCYTE [DISTWIDTH] IN BLOOD BY AUTOMATED COUNT: 14.6 % (ref 11.5–14.5)
EST. GFR  (AFRICAN AMERICAN): 48.3 ML/MIN/1.73 M^2
EST. GFR  (NON AFRICAN AMERICAN): 41.9 ML/MIN/1.73 M^2
GLUCOSE SERPL-MCNC: 102 MG/DL (ref 70–110)
HCT VFR BLD AUTO: 43.7 % (ref 37–48.5)
HGB BLD-MCNC: 13.4 G/DL (ref 12–16)
IMM GRANULOCYTES # BLD AUTO: 0.05 K/UL (ref 0–0.04)
MCH RBC QN AUTO: 31 PG (ref 27–31)
MCHC RBC AUTO-ENTMCNC: 30.7 G/DL (ref 32–36)
MCV RBC AUTO: 101 FL (ref 82–98)
NEUTROPHILS # BLD AUTO: 6 K/UL (ref 1.8–7.7)
PLATELET # BLD AUTO: 250 K/UL (ref 150–350)
PMV BLD AUTO: 9.2 FL (ref 9.2–12.9)
POTASSIUM SERPL-SCNC: 4.4 MMOL/L (ref 3.5–5.1)
PROT SERPL-MCNC: 6.6 G/DL (ref 6–8.4)
RBC # BLD AUTO: 4.32 M/UL (ref 4–5.4)
SODIUM SERPL-SCNC: 143 MMOL/L (ref 136–145)
WBC # BLD AUTO: 8.31 K/UL (ref 3.9–12.7)

## 2020-02-18 PROCEDURE — 85027 COMPLETE CBC AUTOMATED: CPT

## 2020-02-18 PROCEDURE — 80053 COMPREHEN METABOLIC PANEL: CPT

## 2020-02-18 PROCEDURE — 36415 COLL VENOUS BLD VENIPUNCTURE: CPT | Mod: PN

## 2020-02-19 ENCOUNTER — TELEPHONE (OUTPATIENT)
Dept: HEMATOLOGY/ONCOLOGY | Facility: CLINIC | Age: 85
End: 2020-02-19

## 2020-02-19 ENCOUNTER — PATIENT MESSAGE (OUTPATIENT)
Dept: HEMATOLOGY/ONCOLOGY | Facility: CLINIC | Age: 85
End: 2020-02-19

## 2020-02-19 NOTE — TELEPHONE ENCOUNTER
"Returned call to pt.  Pt unavailable.   Left message for pt to return call.   Callback number provided.      ----- Message from Adelaide Murguia sent at 2/19/2020 10:49 AM CST -----  Contact: Soni   Consult/Advisory:    Name Of Caller: Soni  Provider Name: Dr. Cerna   Does patient feel the need to be seen today? No  Relationship to the Pt?: Self   Contact Preference?: [phone]  What is the nature of the call?:  Pt want confirmation lab results are in and to verify the results were also sent to her physician in [place]     Additional Notes:  "Thank you for all that you do for our patients'"        "

## 2020-04-14 ENCOUNTER — LAB VISIT (OUTPATIENT)
Dept: LAB | Facility: HOSPITAL | Age: 85
End: 2020-04-14
Attending: INTERNAL MEDICINE
Payer: MEDICARE

## 2020-04-14 DIAGNOSIS — N30.01 ACUTE CYSTITIS WITH HEMATURIA: ICD-10-CM

## 2020-04-14 LAB
ALBUMIN SERPL BCP-MCNC: 3.4 G/DL (ref 3.5–5.2)
ALP SERPL-CCNC: 120 U/L (ref 55–135)
ALT SERPL W/O P-5'-P-CCNC: 76 U/L (ref 10–44)
ANION GAP SERPL CALC-SCNC: 11 MMOL/L (ref 8–16)
AST SERPL-CCNC: 33 U/L (ref 10–40)
BILIRUB SERPL-MCNC: 0.4 MG/DL (ref 0.1–1)
BUN SERPL-MCNC: 28 MG/DL (ref 8–23)
CALCIUM SERPL-MCNC: 10.2 MG/DL (ref 8.7–10.5)
CHLORIDE SERPL-SCNC: 103 MMOL/L (ref 95–110)
CO2 SERPL-SCNC: 25 MMOL/L (ref 23–29)
CREAT SERPL-MCNC: 1.2 MG/DL (ref 0.5–1.4)
ERYTHROCYTE [DISTWIDTH] IN BLOOD BY AUTOMATED COUNT: 15.4 % (ref 11.5–14.5)
EST. GFR  (AFRICAN AMERICAN): 48 ML/MIN/1.73 M^2
EST. GFR  (NON AFRICAN AMERICAN): 41.6 ML/MIN/1.73 M^2
GLUCOSE SERPL-MCNC: 114 MG/DL (ref 70–110)
HCT VFR BLD AUTO: 37.1 % (ref 37–48.5)
HGB BLD-MCNC: 12.5 G/DL (ref 12–16)
IMM GRANULOCYTES # BLD AUTO: 0.04 K/UL (ref 0–0.04)
MCH RBC QN AUTO: 32.3 PG (ref 27–31)
MCHC RBC AUTO-ENTMCNC: 33.7 G/DL (ref 32–36)
MCV RBC AUTO: 96 FL (ref 82–98)
NEUTROPHILS # BLD AUTO: 3.9 K/UL (ref 1.8–7.7)
PLATELET # BLD AUTO: 146 K/UL (ref 150–350)
PMV BLD AUTO: 8.9 FL (ref 9.2–12.9)
POTASSIUM SERPL-SCNC: 5.1 MMOL/L (ref 3.5–5.1)
PROT SERPL-MCNC: 7.2 G/DL (ref 6–8.4)
RBC # BLD AUTO: 3.87 M/UL (ref 4–5.4)
SODIUM SERPL-SCNC: 139 MMOL/L (ref 136–145)
WBC # BLD AUTO: 4.87 K/UL (ref 3.9–12.7)

## 2020-04-14 PROCEDURE — 80053 COMPREHEN METABOLIC PANEL: CPT

## 2020-04-14 PROCEDURE — 85027 COMPLETE CBC AUTOMATED: CPT

## 2020-04-14 PROCEDURE — 36415 COLL VENOUS BLD VENIPUNCTURE: CPT | Mod: PN

## 2020-05-06 ENCOUNTER — PATIENT MESSAGE (OUTPATIENT)
Dept: HEMATOLOGY/ONCOLOGY | Facility: CLINIC | Age: 85
End: 2020-05-06

## 2020-05-08 ENCOUNTER — LAB VISIT (OUTPATIENT)
Dept: LAB | Facility: HOSPITAL | Age: 85
End: 2020-05-08
Attending: INTERNAL MEDICINE
Payer: MEDICARE

## 2020-05-08 DIAGNOSIS — N30.01 ACUTE CYSTITIS WITH HEMATURIA: ICD-10-CM

## 2020-05-08 LAB
ALBUMIN SERPL BCP-MCNC: 3.5 G/DL (ref 3.5–5.2)
ALP SERPL-CCNC: 140 U/L (ref 55–135)
ALT SERPL W/O P-5'-P-CCNC: 43 U/L (ref 10–44)
ANION GAP SERPL CALC-SCNC: 9 MMOL/L (ref 8–16)
AST SERPL-CCNC: 37 U/L (ref 10–40)
BILIRUB SERPL-MCNC: 0.4 MG/DL (ref 0.1–1)
BUN SERPL-MCNC: 34 MG/DL (ref 8–23)
CALCIUM SERPL-MCNC: 9.5 MG/DL (ref 8.7–10.5)
CHLORIDE SERPL-SCNC: 103 MMOL/L (ref 95–110)
CO2 SERPL-SCNC: 25 MMOL/L (ref 23–29)
CREAT SERPL-MCNC: 1.4 MG/DL (ref 0.5–1.4)
ERYTHROCYTE [DISTWIDTH] IN BLOOD BY AUTOMATED COUNT: 19.2 % (ref 11.5–14.5)
EST. GFR  (AFRICAN AMERICAN): 39.8 ML/MIN/1.73 M^2
EST. GFR  (NON AFRICAN AMERICAN): 34.5 ML/MIN/1.73 M^2
GLUCOSE SERPL-MCNC: 101 MG/DL (ref 70–110)
HCT VFR BLD AUTO: 35 % (ref 37–48.5)
HGB BLD-MCNC: 11.3 G/DL (ref 12–16)
IMM GRANULOCYTES # BLD AUTO: 0.45 K/UL (ref 0–0.04)
MCH RBC QN AUTO: 32.6 PG (ref 27–31)
MCHC RBC AUTO-ENTMCNC: 32.3 G/DL (ref 32–36)
MCV RBC AUTO: 101 FL (ref 82–98)
NEUTROPHILS # BLD AUTO: 9 K/UL (ref 1.8–7.7)
PLATELET # BLD AUTO: 162 K/UL (ref 150–350)
PMV BLD AUTO: 9.1 FL (ref 9.2–12.9)
POTASSIUM SERPL-SCNC: 5.1 MMOL/L (ref 3.5–5.1)
PROT SERPL-MCNC: 6.8 G/DL (ref 6–8.4)
RBC # BLD AUTO: 3.47 M/UL (ref 4–5.4)
SODIUM SERPL-SCNC: 137 MMOL/L (ref 136–145)
WBC # BLD AUTO: 11.35 K/UL (ref 3.9–12.7)

## 2020-05-08 PROCEDURE — 85027 COMPLETE CBC AUTOMATED: CPT

## 2020-05-08 PROCEDURE — 80053 COMPREHEN METABOLIC PANEL: CPT

## 2020-05-08 PROCEDURE — 36415 COLL VENOUS BLD VENIPUNCTURE: CPT | Mod: PN
